# Patient Record
Sex: FEMALE | Race: WHITE | NOT HISPANIC OR LATINO | Employment: FULL TIME | URBAN - METROPOLITAN AREA
[De-identification: names, ages, dates, MRNs, and addresses within clinical notes are randomized per-mention and may not be internally consistent; named-entity substitution may affect disease eponyms.]

---

## 2023-09-20 VITALS — HEIGHT: 69 IN | WEIGHT: 182.54 LBS | BODY MASS INDEX: 27.04 KG/M2

## 2023-09-20 DIAGNOSIS — C77.3 CARCINOMA OF RIGHT BREAST METASTATIC TO AXILLARY LYMPH NODE (MULTI): Primary | ICD-10-CM

## 2023-09-20 DIAGNOSIS — C50.911 CARCINOMA OF RIGHT BREAST METASTATIC TO AXILLARY LYMPH NODE (MULTI): Primary | ICD-10-CM

## 2023-09-20 PROBLEM — Z90.13 STATUS POST BILATERAL MASTECTOMY: Status: ACTIVE | Noted: 2023-09-20

## 2023-09-20 PROBLEM — C50.919: Status: ACTIVE | Noted: 2023-09-20

## 2023-09-20 PROBLEM — G62.9 NEUROPATHY: Status: ACTIVE | Noted: 2023-09-20

## 2023-09-20 LAB
ALANINE AMINOTRANSFERASE (SGPT) (U/L) IN SER/PLAS: 14 U/L (ref 7–45)
ALBUMIN (G/DL) IN SER/PLAS: 4.7 G/DL (ref 3.4–5)
ALKALINE PHOSPHATASE (U/L) IN SER/PLAS: 59 U/L (ref 33–110)
ANION GAP IN SER/PLAS: 13 MMOL/L (ref 10–20)
ASPARTATE AMINOTRANSFERASE (SGOT) (U/L) IN SER/PLAS: 17 U/L (ref 9–39)
BASOPHILS (10*3/UL) IN BLOOD BY AUTOMATED COUNT: 0.03 X10E9/L (ref 0–0.1)
BASOPHILS/100 LEUKOCYTES IN BLOOD BY AUTOMATED COUNT: 0.6 % (ref 0–2)
BILIRUBIN TOTAL (MG/DL) IN SER/PLAS: 0.6 MG/DL (ref 0–1.2)
CALCIUM (MG/DL) IN SER/PLAS: 9.8 MG/DL (ref 8.6–10.3)
CARBON DIOXIDE, TOTAL (MMOL/L) IN SER/PLAS: 30 MMOL/L (ref 21–32)
CHLORIDE (MMOL/L) IN SER/PLAS: 103 MMOL/L (ref 98–107)
CREATININE (MG/DL) IN SER/PLAS: 0.95 MG/DL (ref 0.5–1.05)
EOSINOPHILS (10*3/UL) IN BLOOD BY AUTOMATED COUNT: 0.12 X10E9/L (ref 0–0.7)
EOSINOPHILS/100 LEUKOCYTES IN BLOOD BY AUTOMATED COUNT: 2.2 % (ref 0–6)
ERYTHROCYTE DISTRIBUTION WIDTH (RATIO) BY AUTOMATED COUNT: 12.3 % (ref 11.5–14.5)
ERYTHROCYTE MEAN CORPUSCULAR HEMOGLOBIN CONCENTRATION (G/DL) BY AUTOMATED: 33.7 G/DL (ref 32–36)
ERYTHROCYTE MEAN CORPUSCULAR VOLUME (FL) BY AUTOMATED COUNT: 87 FL (ref 80–100)
ERYTHROCYTES (10*6/UL) IN BLOOD BY AUTOMATED COUNT: 4.38 X10E12/L (ref 4–5.2)
ESTRADIOL (PG/ML) IN SER/PLAS: <20 PG/ML
FOLLITROPIN (IU/L) IN SER/PLAS: 7.3 IU/L
GFR FEMALE: 80 ML/MIN/1.73M2
GLUCOSE (MG/DL) IN SER/PLAS: 98 MG/DL (ref 74–99)
HEMATOCRIT (%) IN BLOOD BY AUTOMATED COUNT: 38.3 % (ref 36–46)
HEMOGLOBIN (G/DL) IN BLOOD: 12.9 G/DL (ref 12–16)
IMMATURE GRANULOCYTES/100 LEUKOCYTES IN BLOOD BY AUTOMATED COUNT: 0.4 % (ref 0–0.9)
LEUKOCYTES (10*3/UL) IN BLOOD BY AUTOMATED COUNT: 5.4 X10E9/L (ref 4.4–11.3)
LUTEINIZING HORMONE (IU/ML) IN SER/PLAS: 0.1 IU/L
LYMPHOCYTES (10*3/UL) IN BLOOD BY AUTOMATED COUNT: 1.8 X10E9/L (ref 1.2–4.8)
LYMPHOCYTES/100 LEUKOCYTES IN BLOOD BY AUTOMATED COUNT: 33.5 % (ref 13–44)
MONOCYTES (10*3/UL) IN BLOOD BY AUTOMATED COUNT: 0.32 X10E9/L (ref 0.1–1)
MONOCYTES/100 LEUKOCYTES IN BLOOD BY AUTOMATED COUNT: 6 % (ref 2–10)
NEUTROPHILS (10*3/UL) IN BLOOD BY AUTOMATED COUNT: 3.08 X10E9/L (ref 1.2–7.7)
NEUTROPHILS/100 LEUKOCYTES IN BLOOD BY AUTOMATED COUNT: 57.3 % (ref 40–80)
PLATELETS (10*3/UL) IN BLOOD AUTOMATED COUNT: 201 X10E9/L (ref 150–450)
POTASSIUM (MMOL/L) IN SER/PLAS: 3.7 MMOL/L (ref 3.5–5.3)
PROTEIN TOTAL: 7.5 G/DL (ref 6.4–8.2)
SODIUM (MMOL/L) IN SER/PLAS: 142 MMOL/L (ref 136–145)
UREA NITROGEN (MG/DL) IN SER/PLAS: 12 MG/DL (ref 6–23)

## 2023-09-20 RX ORDER — EPINEPHRINE 0.3 MG/.3ML
0.3 INJECTION SUBCUTANEOUS EVERY 5 MIN PRN
Status: CANCELLED | OUTPATIENT
Start: 2023-09-20

## 2023-09-20 RX ORDER — DIPHENHYDRAMINE HYDROCHLORIDE 50 MG/ML
50 INJECTION INTRAMUSCULAR; INTRAVENOUS AS NEEDED
Status: CANCELLED | OUTPATIENT
Start: 2023-09-20

## 2023-09-20 RX ORDER — ALBUTEROL SULFATE 0.83 MG/ML
3 SOLUTION RESPIRATORY (INHALATION) AS NEEDED
Status: CANCELLED | OUTPATIENT
Start: 2023-09-20

## 2023-09-20 RX ORDER — FAMOTIDINE 10 MG/ML
20 INJECTION INTRAVENOUS ONCE AS NEEDED
Status: CANCELLED | OUTPATIENT
Start: 2023-09-20

## 2023-09-20 RX ORDER — LIDOCAINE HYDROCHLORIDE 10 MG/ML
2 INJECTION, SOLUTION EPIDURAL; INFILTRATION; INTRACAUDAL; PERINEURAL ONCE
Status: CANCELLED | OUTPATIENT
Start: 2023-09-20

## 2023-09-21 RX ORDER — FAMOTIDINE 10 MG/ML
20 INJECTION INTRAVENOUS ONCE AS NEEDED
Status: CANCELLED | OUTPATIENT
Start: 2023-10-18

## 2023-09-21 RX ORDER — EPINEPHRINE 0.3 MG/.3ML
0.3 INJECTION SUBCUTANEOUS EVERY 5 MIN PRN
Status: CANCELLED | OUTPATIENT
Start: 2023-10-18

## 2023-09-21 RX ORDER — DIPHENHYDRAMINE HYDROCHLORIDE 50 MG/ML
50 INJECTION INTRAMUSCULAR; INTRAVENOUS AS NEEDED
Status: CANCELLED | OUTPATIENT
Start: 2023-10-18

## 2023-09-21 RX ORDER — ALBUTEROL SULFATE 0.83 MG/ML
3 SOLUTION RESPIRATORY (INHALATION) AS NEEDED
Status: CANCELLED | OUTPATIENT
Start: 2023-10-18

## 2023-09-21 RX ORDER — LIDOCAINE HYDROCHLORIDE 10 MG/ML
2 INJECTION, SOLUTION EPIDURAL; INFILTRATION; INTRACAUDAL; PERINEURAL ONCE
Status: CANCELLED | OUTPATIENT
Start: 2023-10-18

## 2023-10-11 PROBLEM — B00.9 HSV-1 INFECTION: Status: ACTIVE | Noted: 2023-10-11

## 2023-10-11 PROBLEM — K21.9 GERD (GASTROESOPHAGEAL REFLUX DISEASE): Status: ACTIVE | Noted: 2023-10-11

## 2023-10-11 PROBLEM — Z15.09 MONOALLELIC MUTATION OF PALB2 GENE: Status: ACTIVE | Noted: 2023-10-11

## 2023-10-11 PROBLEM — Z15.89 MONOALLELIC MUTATION OF PALB2 GENE: Status: ACTIVE | Noted: 2023-10-11

## 2023-10-11 PROBLEM — Z97.5 IUD (INTRAUTERINE DEVICE) IN PLACE: Status: ACTIVE | Noted: 2023-10-11

## 2023-10-11 PROBLEM — N93.9 ABNORMAL UTERINE BLEEDING: Status: ACTIVE | Noted: 2023-10-11

## 2023-10-11 PROBLEM — N95.8 ARTIFICIAL MENOPAUSE STATE: Status: ACTIVE | Noted: 2023-10-11

## 2023-10-11 PROBLEM — R59.0 AXILLARY LYMPHADENOPATHY: Status: ACTIVE | Noted: 2023-10-11

## 2023-10-11 PROBLEM — Z15.01 MONOALLELIC MUTATION OF PALB2 GENE: Status: ACTIVE | Noted: 2023-10-11

## 2023-10-11 RX ORDER — NEOMYCIN/POLYMYXIN B/PRAMOXINE 3.5-10K-1
CREAM (GRAM) TOPICAL
COMMUNITY
End: 2023-10-13 | Stop reason: WASHOUT

## 2023-10-11 RX ORDER — ACYCLOVIR 400 MG/1
400 TABLET ORAL 2 TIMES DAILY
COMMUNITY
Start: 2023-07-29

## 2023-10-11 RX ORDER — CHOLECALCIFEROL (VITAMIN D3) 25 MCG
TABLET ORAL
COMMUNITY

## 2023-10-11 RX ORDER — ZOLEDRONIC ACID 4 MG
KIT INTRAVENOUS
COMMUNITY

## 2023-10-11 RX ORDER — MULTIVITAMIN
1 TABLET ORAL DAILY
COMMUNITY

## 2023-10-11 RX ORDER — ALBUTEROL SULFATE 90 UG/1
AEROSOL, METERED RESPIRATORY (INHALATION)
COMMUNITY
Start: 2021-12-04 | End: 2023-10-13 | Stop reason: WASHOUT

## 2023-10-11 RX ORDER — DIAPER,BRIEF,ADULT, DISPOSABLE
1 EACH MISCELLANEOUS DAILY
COMMUNITY
Start: 2020-06-09

## 2023-10-11 RX ORDER — GABAPENTIN 100 MG/1
100 CAPSULE ORAL 3 TIMES DAILY
COMMUNITY
End: 2023-10-13 | Stop reason: WASHOUT

## 2023-10-11 RX ORDER — ANASTROZOLE 1 MG/1
1 TABLET ORAL DAILY
COMMUNITY
End: 2024-02-09 | Stop reason: SDUPTHER

## 2023-10-13 ENCOUNTER — OFFICE VISIT (OUTPATIENT)
Dept: PRIMARY CARE | Facility: CLINIC | Age: 33
End: 2023-10-13
Payer: COMMERCIAL

## 2023-10-13 VITALS
WEIGHT: 182.8 LBS | BODY MASS INDEX: 26.17 KG/M2 | DIASTOLIC BLOOD PRESSURE: 66 MMHG | HEART RATE: 65 BPM | HEIGHT: 70 IN | SYSTOLIC BLOOD PRESSURE: 114 MMHG

## 2023-10-13 DIAGNOSIS — Z00.00 ANNUAL PHYSICAL EXAM: Primary | ICD-10-CM

## 2023-10-13 DIAGNOSIS — E78.00 ELEVATED LDL CHOLESTEROL LEVEL: ICD-10-CM

## 2023-10-13 LAB
CHOLEST SERPL-MCNC: 181 MG/DL (ref 0–199)
CHOLESTEROL/HDL RATIO: 3.4
HDLC SERPL-MCNC: 53.2 MG/DL
LDLC SERPL CALC-MCNC: 115 MG/DL
NON HDL CHOLESTEROL: 128 MG/DL (ref 0–149)
TRIGL SERPL-MCNC: 64 MG/DL (ref 0–149)
VLDL: 13 MG/DL (ref 0–40)

## 2023-10-13 PROCEDURE — 99395 PREV VISIT EST AGE 18-39: CPT | Performed by: NURSE PRACTITIONER

## 2023-10-13 PROCEDURE — 80061 LIPID PANEL: CPT

## 2023-10-13 PROCEDURE — 1036F TOBACCO NON-USER: CPT | Performed by: NURSE PRACTITIONER

## 2023-10-13 PROCEDURE — 36415 COLL VENOUS BLD VENIPUNCTURE: CPT

## 2023-10-13 RX ORDER — PYRIDOXINE HCL (VITAMIN B6) 25 MG
25 TABLET ORAL DAILY
COMMUNITY

## 2023-10-13 RX ORDER — MAGNESIUM 250 MG
TABLET ORAL
COMMUNITY

## 2023-10-13 NOTE — PROGRESS NOTES
Nathaly Powell is a 33 y.o. female who is presenting for annual physical exam.     Last  Visit: 22  Reported Health: Good    Dental, Vision, Hearing:  Regular dental visits: yes  - Brushes teeth 2 times per day  - Flosses? yes  Vision problems: yes  - Wears glasses or contacts? yes, reading glasses  - Last eye exam:  a couple months ago  Hearing loss: no    Immunization status:  Up to date: no    Lifestyle:  Healthy diet: yes  Regular exercise: yes  - Exercise frequency: 3 days per week  - Type of exercise: Sylvia, Weight training  Alcohol: yes, occasionally  Tobacco: no  Drugs: no    Reproductive Health:  Menstrual problems: menopausal state due to cancer treatment  Sexually active: yes  Contraception: none    Pregnancy History:   : 2  Parity: 2  - Full term: 1  - Premature: 1  - (s):  - Livin  - AB Induced:  - AB Spont:  - Ectopic:   - Multiple births:    Cervical Cancer Screening:  Last pap:  23  History of abnormal pap smear? no  Breast Cancer Screening: n/a - hx of breast CA  Colorectal Cancer Screening:  Last colonoscopy or Cologuard:  none  Metabolic Screening:  Lipid profile: 2022  Glucose screen: 2022    Review of Systems  Gen: denies fever, chills, weight loss, fatigue  HEENT: denies sinus pressure, sinus congestion, runny nose, red eyes, itchy eyes, vision loss, ear pain, hearing loss, throat pain, trouble swallowing  Neck: denies neck pain, neck swelling or masses  Chest/breast: denies breast pain, breast lumps  CV: denies chest pain, palpitations, fast heart rate, syncope  Resp: denies shortness of breath, cough, wheezing  GI: denies abdominal pain, nausea, diarrhea, constipation, hematochezia, melena  : denies dysuria, hematuria, vaginal discharge, frequency  Endo: denies polydipsia, polyuria, heat/cold intolerance, weight change, hair thinning  Heme: denies easy bruising, easy bleeding  Neuro: denies headache, numbness, tingling, memory loss, changes  in vision  MSK: denies joint pain, joint swelling, weakness  Psych: denies suicidal ideation, homicidal ideation, depression, anxiety, mood swings  Skin: denies rashes, abnormal lesions, itching, changes in moles     Previous History  Past Medical History:   Diagnosis Date    Encounter for supervision of other normal pregnancy, third trimester 2017    Prenatal care, subsequent pregnancy in third trimester    History of uterine scar from previous surgery 2019    History of  delivery    Personal history of malignant neoplasm of breast 2020    History of malignant neoplasm of female breast    Presence of (intrauterine) contraceptive device     Intrauterine device     Past Surgical History:   Procedure Laterality Date     SECTION, CLASSIC  2019     Section    CT ABDOMEN PELVIS ANGIOGRAM W AND/OR WO IV CONTRAST  2020    CT ABDOMEN PELVIS ANGIOGRAM W AND/OR WO IV CONTRAST 2020 [facility]    OTHER SURGICAL HISTORY  2020    Mastectomy bilateral    OTHER SURGICAL HISTORY  2019    Tonsillectomy with adenoidectomy     Social History     Tobacco Use    Smoking status: Never    Smokeless tobacco: Never   Substance Use Topics    Alcohol use: Yes     Comment: occasional    Drug use: Never     Family History   Problem Relation Name Age of Onset    Breast cancer Mother      Prostate cancer Maternal Grandfather       No Known Allergies  Current Outpatient Medications   Medication Instructions    acyclovir (ZOVIRAX) 400 mg, oral, 2 times daily    albuterol 90 mcg/actuation inhaler inhalation    anastrozole (Arimidex) 1 mg tablet 1 tablet, oral, Daily    calcium carb/vitamin D3/vit K1 (CALCIUM-VITAMIN D3-VITAMIN K ORAL) oral    cholecalciferol (Vitamin D-3) 25 MCG (1000 UT) tablet oral    gabapentin (NEURONTIN) 100 mg, oral, 3 times daily    lysine 500 mg tablet 1 tablet, oral, Daily    multivitamin tablet 1 tablet, oral, Daily    mv-min-folic acid-lutein  200-137.5 mcg tablet,chewable oral, Daily RT    zoledronic acid (Zometa) 4 mg injection intravenous       Physical Exam  General: Alert and oriented, in no acute distress. Appears stated age, well-nourished, and well hydrated  HEENT:  - Head: Normocephalic and atraumatic   - Eyes: EOMI, PERRLA  - ENT: Hearing grossly intact. Mucus membranes pink and moist without lesions. Tonsils present without swelling or exudates. Good dentition. TMs gray  Neck: Supple. No stiffness. No thyromegaly or thyroid nodules  Heart: RRR. No murmurs, clicks, or rubs  Lungs: Unlabored breathing. CTAB with no crackles, wheezes, or rhonchi  Abdomen: Normal BS in all 4 quadrants. Soft, non-tender, non-distended, with no masses  Extremities: Warm and well perfused. No edema. Normal peripheral pulses  Musculoskeletal: ROM intact. Strength 5/5 in BUE and BLE. No joint swelling. Normal gait and station  Neurological: Alert and oriented. No gross neurological deficits. Normal sensation. No weakness. DTRs +2/4   Psychological: Appropriate mood and affect  Skin: No rash, abnormal lesions, cyanosis, or erythema      Assessment and Plan     Annual Physical  - Declined flu shot, plans to get next week at work  - Pap up to date, due February 2028  - Check lipid panel  - Maintain healthy lifestyle    RTC 1 year for physical or sooner ALTA Poole-CNP  [facility]

## 2023-10-18 ENCOUNTER — INFUSION (OUTPATIENT)
Dept: HEMATOLOGY/ONCOLOGY | Facility: CLINIC | Age: 33
End: 2023-10-18
Payer: COMMERCIAL

## 2023-10-18 ENCOUNTER — APPOINTMENT (OUTPATIENT)
Dept: HEMATOLOGY/ONCOLOGY | Facility: CLINIC | Age: 33
End: 2023-10-18
Payer: COMMERCIAL

## 2023-10-18 VITALS
TEMPERATURE: 98.1 F | SYSTOLIC BLOOD PRESSURE: 110 MMHG | WEIGHT: 186.07 LBS | BODY MASS INDEX: 26.7 KG/M2 | HEART RATE: 72 BPM | OXYGEN SATURATION: 100 % | DIASTOLIC BLOOD PRESSURE: 73 MMHG | RESPIRATION RATE: 16 BRPM

## 2023-10-18 DIAGNOSIS — C77.3 CARCINOMA OF RIGHT BREAST METASTATIC TO AXILLARY LYMPH NODE (MULTI): ICD-10-CM

## 2023-10-18 DIAGNOSIS — C50.911 CARCINOMA OF RIGHT BREAST METASTATIC TO AXILLARY LYMPH NODE (MULTI): ICD-10-CM

## 2023-10-18 PROCEDURE — 2500000005 HC RX 250 GENERAL PHARMACY W/O HCPCS: Performed by: NURSE PRACTITIONER

## 2023-10-18 PROCEDURE — 2500000004 HC RX 250 GENERAL PHARMACY W/ HCPCS (ALT 636 FOR OP/ED): Mod: JZ | Performed by: NURSE PRACTITIONER

## 2023-10-18 PROCEDURE — 96402 CHEMO HORMON ANTINEOPL SQ/IM: CPT

## 2023-10-18 RX ORDER — DIPHENHYDRAMINE HYDROCHLORIDE 50 MG/ML
50 INJECTION INTRAMUSCULAR; INTRAVENOUS AS NEEDED
Status: CANCELLED | OUTPATIENT
Start: 2023-11-15

## 2023-10-18 RX ORDER — LIDOCAINE HYDROCHLORIDE 10 MG/ML
2 INJECTION, SOLUTION EPIDURAL; INFILTRATION; INTRACAUDAL; PERINEURAL ONCE
Status: COMPLETED | OUTPATIENT
Start: 2023-10-18 | End: 2023-10-18

## 2023-10-18 RX ORDER — LIDOCAINE HYDROCHLORIDE 10 MG/ML
2 INJECTION, SOLUTION EPIDURAL; INFILTRATION; INTRACAUDAL; PERINEURAL ONCE
Status: CANCELLED | OUTPATIENT
Start: 2023-11-15

## 2023-10-18 RX ORDER — FAMOTIDINE 10 MG/ML
20 INJECTION INTRAVENOUS ONCE AS NEEDED
Status: CANCELLED | OUTPATIENT
Start: 2023-11-15

## 2023-10-18 RX ORDER — ALBUTEROL SULFATE 0.83 MG/ML
3 SOLUTION RESPIRATORY (INHALATION) AS NEEDED
Status: CANCELLED | OUTPATIENT
Start: 2023-11-15

## 2023-10-18 RX ORDER — EPINEPHRINE 0.3 MG/.3ML
0.3 INJECTION SUBCUTANEOUS EVERY 5 MIN PRN
Status: CANCELLED | OUTPATIENT
Start: 2023-11-15

## 2023-10-18 RX ADMIN — GOSERELIN ACETATE 3.6 MG: 3.6 IMPLANT SUBCUTANEOUS at 16:33

## 2023-10-18 RX ADMIN — LIDOCAINE HYDROCHLORIDE 20 MG: 10 INJECTION, SOLUTION EPIDURAL; INFILTRATION; INTRACAUDAL; PERINEURAL at 16:33

## 2023-10-18 ASSESSMENT — PAIN SCALES - GENERAL: PAINLEVEL: 0-NO PAIN

## 2023-11-15 ENCOUNTER — APPOINTMENT (OUTPATIENT)
Dept: HEMATOLOGY/ONCOLOGY | Facility: CLINIC | Age: 33
End: 2023-11-15
Payer: COMMERCIAL

## 2023-11-15 ENCOUNTER — INFUSION (OUTPATIENT)
Dept: HEMATOLOGY/ONCOLOGY | Facility: CLINIC | Age: 33
End: 2023-11-15
Payer: COMMERCIAL

## 2023-11-15 VITALS
HEART RATE: 65 BPM | SYSTOLIC BLOOD PRESSURE: 104 MMHG | WEIGHT: 186.73 LBS | BODY MASS INDEX: 26.79 KG/M2 | DIASTOLIC BLOOD PRESSURE: 65 MMHG | RESPIRATION RATE: 16 BRPM | OXYGEN SATURATION: 97 % | TEMPERATURE: 99.5 F

## 2023-11-15 DIAGNOSIS — C77.3 CARCINOMA OF RIGHT BREAST METASTATIC TO AXILLARY LYMPH NODE (MULTI): ICD-10-CM

## 2023-11-15 DIAGNOSIS — C50.911 CARCINOMA OF RIGHT BREAST METASTATIC TO AXILLARY LYMPH NODE (MULTI): ICD-10-CM

## 2023-11-15 PROCEDURE — 96402 CHEMO HORMON ANTINEOPL SQ/IM: CPT

## 2023-11-15 PROCEDURE — 2500000004 HC RX 250 GENERAL PHARMACY W/ HCPCS (ALT 636 FOR OP/ED): Mod: JZ | Performed by: NURSE PRACTITIONER

## 2023-11-15 PROCEDURE — 2500000005 HC RX 250 GENERAL PHARMACY W/O HCPCS: Performed by: NURSE PRACTITIONER

## 2023-11-15 PROCEDURE — 96372 THER/PROPH/DIAG INJ SC/IM: CPT

## 2023-11-15 RX ORDER — FAMOTIDINE 10 MG/ML
20 INJECTION INTRAVENOUS ONCE AS NEEDED
Status: CANCELLED | OUTPATIENT
Start: 2023-12-13

## 2023-11-15 RX ORDER — LIDOCAINE HYDROCHLORIDE 10 MG/ML
2 INJECTION, SOLUTION EPIDURAL; INFILTRATION; INTRACAUDAL; PERINEURAL ONCE
Status: COMPLETED | OUTPATIENT
Start: 2023-11-15 | End: 2023-11-15

## 2023-11-15 RX ORDER — LIDOCAINE HYDROCHLORIDE 10 MG/ML
2 INJECTION, SOLUTION EPIDURAL; INFILTRATION; INTRACAUDAL; PERINEURAL ONCE
Status: CANCELLED | OUTPATIENT
Start: 2023-12-13

## 2023-11-15 RX ORDER — EPINEPHRINE 0.3 MG/.3ML
0.3 INJECTION SUBCUTANEOUS EVERY 5 MIN PRN
Status: CANCELLED | OUTPATIENT
Start: 2023-12-13

## 2023-11-15 RX ORDER — DIPHENHYDRAMINE HYDROCHLORIDE 50 MG/ML
50 INJECTION INTRAMUSCULAR; INTRAVENOUS AS NEEDED
Status: CANCELLED | OUTPATIENT
Start: 2023-12-13

## 2023-11-15 RX ORDER — ALBUTEROL SULFATE 0.83 MG/ML
3 SOLUTION RESPIRATORY (INHALATION) AS NEEDED
Status: CANCELLED | OUTPATIENT
Start: 2023-12-13

## 2023-11-15 RX ADMIN — GOSERELIN ACETATE 3.6 MG: 3.6 IMPLANT SUBCUTANEOUS at 16:28

## 2023-11-15 RX ADMIN — LIDOCAINE HYDROCHLORIDE 20 MG: 10 INJECTION, SOLUTION EPIDURAL; INFILTRATION; INTRACAUDAL; PERINEURAL at 16:23

## 2023-11-15 ASSESSMENT — PAIN SCALES - GENERAL: PAINLEVEL: 0-NO PAIN

## 2023-12-13 ENCOUNTER — INFUSION (OUTPATIENT)
Dept: HEMATOLOGY/ONCOLOGY | Facility: CLINIC | Age: 33
End: 2023-12-13
Payer: COMMERCIAL

## 2023-12-13 VITALS
WEIGHT: 185.41 LBS | SYSTOLIC BLOOD PRESSURE: 101 MMHG | HEART RATE: 68 BPM | TEMPERATURE: 98.1 F | BODY MASS INDEX: 26.6 KG/M2 | DIASTOLIC BLOOD PRESSURE: 65 MMHG | RESPIRATION RATE: 18 BRPM

## 2023-12-13 DIAGNOSIS — C77.3 CARCINOMA OF RIGHT BREAST METASTATIC TO AXILLARY LYMPH NODE (MULTI): ICD-10-CM

## 2023-12-13 DIAGNOSIS — C50.911 CARCINOMA OF RIGHT BREAST METASTATIC TO AXILLARY LYMPH NODE (MULTI): ICD-10-CM

## 2023-12-13 PROCEDURE — 96402 CHEMO HORMON ANTINEOPL SQ/IM: CPT

## 2023-12-13 PROCEDURE — 2500000004 HC RX 250 GENERAL PHARMACY W/ HCPCS (ALT 636 FOR OP/ED): Mod: JZ | Performed by: NURSE PRACTITIONER

## 2023-12-13 PROCEDURE — 2500000005 HC RX 250 GENERAL PHARMACY W/O HCPCS: Performed by: NURSE PRACTITIONER

## 2023-12-13 RX ORDER — ALBUTEROL SULFATE 0.83 MG/ML
3 SOLUTION RESPIRATORY (INHALATION) AS NEEDED
Status: DISCONTINUED | OUTPATIENT
Start: 2023-12-13 | End: 2023-12-13 | Stop reason: HOSPADM

## 2023-12-13 RX ORDER — EPINEPHRINE 0.3 MG/.3ML
0.3 INJECTION SUBCUTANEOUS EVERY 5 MIN PRN
Status: DISCONTINUED | OUTPATIENT
Start: 2023-12-13 | End: 2023-12-13 | Stop reason: HOSPADM

## 2023-12-13 RX ORDER — DIPHENHYDRAMINE HYDROCHLORIDE 50 MG/ML
50 INJECTION INTRAMUSCULAR; INTRAVENOUS AS NEEDED
Status: DISCONTINUED | OUTPATIENT
Start: 2023-12-13 | End: 2023-12-13 | Stop reason: HOSPADM

## 2023-12-13 RX ORDER — EPINEPHRINE 0.3 MG/.3ML
0.3 INJECTION SUBCUTANEOUS EVERY 5 MIN PRN
Status: CANCELLED | OUTPATIENT
Start: 2024-01-10

## 2023-12-13 RX ORDER — DIPHENHYDRAMINE HYDROCHLORIDE 50 MG/ML
50 INJECTION INTRAMUSCULAR; INTRAVENOUS AS NEEDED
Status: CANCELLED | OUTPATIENT
Start: 2024-01-10

## 2023-12-13 RX ORDER — FAMOTIDINE 10 MG/ML
20 INJECTION INTRAVENOUS ONCE AS NEEDED
Status: CANCELLED | OUTPATIENT
Start: 2024-01-10

## 2023-12-13 RX ORDER — FAMOTIDINE 10 MG/ML
20 INJECTION INTRAVENOUS ONCE AS NEEDED
Status: DISCONTINUED | OUTPATIENT
Start: 2023-12-13 | End: 2023-12-13 | Stop reason: HOSPADM

## 2023-12-13 RX ORDER — ALBUTEROL SULFATE 0.83 MG/ML
3 SOLUTION RESPIRATORY (INHALATION) AS NEEDED
Status: CANCELLED | OUTPATIENT
Start: 2024-01-10

## 2023-12-13 RX ORDER — LIDOCAINE HYDROCHLORIDE 10 MG/ML
2 INJECTION, SOLUTION EPIDURAL; INFILTRATION; INTRACAUDAL; PERINEURAL ONCE
Status: COMPLETED | OUTPATIENT
Start: 2023-12-13 | End: 2023-12-13

## 2023-12-13 RX ORDER — LIDOCAINE HYDROCHLORIDE 10 MG/ML
2 INJECTION, SOLUTION EPIDURAL; INFILTRATION; INTRACAUDAL; PERINEURAL ONCE
Status: CANCELLED | OUTPATIENT
Start: 2024-01-10

## 2023-12-13 RX ADMIN — LIDOCAINE HYDROCHLORIDE 20 MG: 10 INJECTION, SOLUTION EPIDURAL; INFILTRATION; INTRACAUDAL; PERINEURAL at 16:26

## 2023-12-13 RX ADMIN — GOSERELIN ACETATE 3.6 MG: 3.6 IMPLANT SUBCUTANEOUS at 16:26

## 2023-12-13 ASSESSMENT — PAIN SCALES - GENERAL: PAINLEVEL: 0-NO PAIN

## 2023-12-15 ENCOUNTER — HOSPITAL ENCOUNTER (OUTPATIENT)
Dept: RADIATION ONCOLOGY | Facility: CLINIC | Age: 33
Setting detail: RADIATION/ONCOLOGY SERIES
Discharge: HOME | End: 2023-12-15
Payer: COMMERCIAL

## 2023-12-15 VITALS
RESPIRATION RATE: 18 BRPM | BODY MASS INDEX: 26.57 KG/M2 | WEIGHT: 185.19 LBS | SYSTOLIC BLOOD PRESSURE: 103 MMHG | TEMPERATURE: 97.9 F | DIASTOLIC BLOOD PRESSURE: 64 MMHG | OXYGEN SATURATION: 99 % | HEART RATE: 62 BPM

## 2023-12-15 DIAGNOSIS — C77.3 CARCINOMA OF RIGHT BREAST METASTATIC TO AXILLARY LYMPH NODE (MULTI): Primary | ICD-10-CM

## 2023-12-15 DIAGNOSIS — C50.919: ICD-10-CM

## 2023-12-15 DIAGNOSIS — C50.911 CARCINOMA OF RIGHT BREAST METASTATIC TO AXILLARY LYMPH NODE (MULTI): Primary | ICD-10-CM

## 2023-12-15 PROCEDURE — 99213 OFFICE O/P EST LOW 20 MIN: CPT | Performed by: NURSE PRACTITIONER

## 2023-12-15 ASSESSMENT — PAIN SCALES - GENERAL: PAINLEVEL: 0-NO PAIN

## 2023-12-15 NOTE — PROGRESS NOTES
Radiation Oncology Follow-Up    Patient Name:  Nathaly Powell  MRN:  [id number]  :  1990    Referring Provider: No ref. provider found  Primary Care Provider: OZ Santos  Care Team: Patient Care Team:  OZ Santos as PCP - General (Family Medicine)  [facility]    Date of Service: 12/15/2023     Cancer Staging:          Breast         AJCC Edition: 8th (AJCC), Diagnosis Date: 2019, IIIA, cT2 cN1 cM0 G3     Treatment Synopsis:    33-year-old female with a clinical T2 N1 M0, stage IIB invasive ductal carcinoma of the right breast status post neoadjuvant dose dense AC ×4 followed  by weekly Taxol ×12. Following chemotherapy she underwent a right mastectomy with sentinel lymph node biopsy and risk reducing contralateral mastectomy. Bilateral mastectomy and right SLN 19. In the right breast was residual microinvasive carcinoma.  LVI remained. 0/3 SLN involved. Left breast benign. Pathology DCIS and invasive ductal carcinoma grade 2-3 ER 95% MI 0% HER2 2+ not amplified. Following surgery, she received radiation to the right chest wall and comprehensive jewel chain consisting of  a dose of 50 Gy with incision boost to 60 Gy completed on 20.  Goserelin initiated 19. Anastrozole initiated 19 with Zometa.  Genetic testing result showed PAL B2 Dutch mutation     SUBJECTIVE  History of Present Illness:   Nathaly Powell is here today for routine follow up post radiation.  She is doing well overall. She is taking anastrozole and tolerating it without adverse effects except some  trigger finger in her thumbs at times.  Continues on goserelin monthly. She says the plan is to continue this for another year and then plan is to change to tamoxifen. Denies lymphedema or difficulty with ROM of UEs. 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 Denies headaches, fever, chills, cough, SOB, chest pain, N/V, or bony pain.  DEXA scan normal.     Review of Systems:    Review of Systems   All other systems reviewed and are negative.    Performance Status:   The Karnofsky performance scale today is 100, Fully active, able to carry on all pre-disease performed without restriction (ECOG equivalent 0).      OBJECTIVE  Vital Signs:  There were no vitals taken for this visit.     Current Outpatient Medications:   •  acyclovir (Zovirax) 400 mg tablet, Take 1 tablet (400 mg) by mouth 2 times a day., Disp: , Rfl:   •  anastrozole (Arimidex) 1 mg tablet, Take 1 tablet (1 mg total) by mouth once daily., Disp: , Rfl:   •  cholecalciferol (Vitamin D-3) 25 MCG (1000 UT) tablet, Take by mouth., Disp: , Rfl:   •  lysine 500 mg tablet, Take 1 tablet (500 mg) by mouth once daily., Disp: , Rfl:   •  magnesium 250 mg tablet, Take by mouth., Disp: , Rfl:   •  multivitamin tablet, Take 1 tablet by mouth once daily., Disp: , Rfl:   •  pyridoxine (Vitamin B-6) 25 mg tablet, Take 1 tablet (25 mg) by mouth once daily., Disp: , Rfl:   •  zoledronic acid (Zometa) 4 mg injection, Infuse into a venous catheter., Disp: , Rfl:      Physical Exam  Constitutional:       Appearance: Normal appearance.   HENT:      Head: Normocephalic and atraumatic.      Nose: Nose normal.      Mouth/Throat:      Mouth: Mucous membranes are moist.      Pharynx: Oropharynx is clear.   Eyes:      Conjunctiva/sclera: Conjunctivae normal.      Pupils: Pupils are equal, round, and reactive to light.   Cardiovascular:      Rate and Rhythm: Normal rate and regular rhythm.      Heart sounds: Normal heart sounds.   Pulmonary:      Effort: Pulmonary effort is normal.      Breath sounds: Normal breath sounds.   Chest:   Breasts:     Right: Absent.      Left: Absent.      Comments: Bilateral TRAM reconstruction - no suspicious palpable nodules or lesions.   Abdominal:      Palpations: Abdomen is soft.   Musculoskeletal:         General: No swelling. Normal range of motion.      Cervical back: Normal  range of motion and neck supple.   Lymphadenopathy:      Cervical: No cervical adenopathy.      Upper Body:      Right upper body: No supraclavicular or axillary adenopathy.      Left upper body: No supraclavicular or axillary adenopathy.   Skin:     General: Skin is warm and dry.   Neurological:      General: No focal deficit present.      Mental Status: She is alert and oriented to person, place, and time.   Psychiatric:         Mood and Affect: Mood normal.         Behavior: Behavior normal.       ASSESSMENT/PLAN:  33 y.o. female with right sided breast cancer s/p neoadjuvant chemotherapy followed by mastectomy and left prophylactic mastectomy followed by bilateral TRAM reconstruction.   Doing well post treatment and she is pleased with breast reconstruction. Continues ovarian suppression and AI.  Radiation follow up in 12 mo for office visit.  Call us with any concerns.        Radha Dickens CNP  [phone]

## 2024-01-10 ENCOUNTER — INFUSION (OUTPATIENT)
Dept: HEMATOLOGY/ONCOLOGY | Facility: CLINIC | Age: 34
End: 2024-01-10
Payer: COMMERCIAL

## 2024-01-10 VITALS
TEMPERATURE: 97.7 F | HEART RATE: 65 BPM | SYSTOLIC BLOOD PRESSURE: 104 MMHG | OXYGEN SATURATION: 99 % | BODY MASS INDEX: 26.92 KG/M2 | WEIGHT: 187.6 LBS | DIASTOLIC BLOOD PRESSURE: 64 MMHG

## 2024-01-10 DIAGNOSIS — C77.3 CARCINOMA OF RIGHT BREAST METASTATIC TO AXILLARY LYMPH NODE (MULTI): ICD-10-CM

## 2024-01-10 DIAGNOSIS — C50.911 CARCINOMA OF RIGHT BREAST METASTATIC TO AXILLARY LYMPH NODE (MULTI): ICD-10-CM

## 2024-01-10 PROCEDURE — 2500000004 HC RX 250 GENERAL PHARMACY W/ HCPCS (ALT 636 FOR OP/ED): Mod: JZ | Performed by: NURSE PRACTITIONER

## 2024-01-10 PROCEDURE — 96402 CHEMO HORMON ANTINEOPL SQ/IM: CPT

## 2024-01-10 PROCEDURE — 2500000005 HC RX 250 GENERAL PHARMACY W/O HCPCS: Performed by: NURSE PRACTITIONER

## 2024-01-10 RX ORDER — EPINEPHRINE 0.3 MG/.3ML
0.3 INJECTION SUBCUTANEOUS EVERY 5 MIN PRN
Status: CANCELLED | OUTPATIENT
Start: 2024-02-07

## 2024-01-10 RX ORDER — LIDOCAINE HYDROCHLORIDE 10 MG/ML
2 INJECTION, SOLUTION EPIDURAL; INFILTRATION; INTRACAUDAL; PERINEURAL ONCE
Status: CANCELLED | OUTPATIENT
Start: 2024-02-07

## 2024-01-10 RX ORDER — FAMOTIDINE 10 MG/ML
20 INJECTION INTRAVENOUS ONCE AS NEEDED
Status: DISCONTINUED | OUTPATIENT
Start: 2024-01-10 | End: 2024-01-10 | Stop reason: HOSPADM

## 2024-01-10 RX ORDER — DIPHENHYDRAMINE HYDROCHLORIDE 50 MG/ML
50 INJECTION INTRAMUSCULAR; INTRAVENOUS AS NEEDED
Status: CANCELLED | OUTPATIENT
Start: 2024-02-07

## 2024-01-10 RX ORDER — LIDOCAINE HYDROCHLORIDE 10 MG/ML
2 INJECTION, SOLUTION EPIDURAL; INFILTRATION; INTRACAUDAL; PERINEURAL ONCE
Status: COMPLETED | OUTPATIENT
Start: 2024-01-10 | End: 2024-01-10

## 2024-01-10 RX ORDER — ALBUTEROL SULFATE 0.83 MG/ML
3 SOLUTION RESPIRATORY (INHALATION) AS NEEDED
Status: DISCONTINUED | OUTPATIENT
Start: 2024-01-10 | End: 2024-01-10 | Stop reason: HOSPADM

## 2024-01-10 RX ORDER — DIPHENHYDRAMINE HYDROCHLORIDE 50 MG/ML
50 INJECTION INTRAMUSCULAR; INTRAVENOUS AS NEEDED
Status: DISCONTINUED | OUTPATIENT
Start: 2024-01-10 | End: 2024-01-10 | Stop reason: HOSPADM

## 2024-01-10 RX ORDER — EPINEPHRINE 0.3 MG/.3ML
0.3 INJECTION SUBCUTANEOUS EVERY 5 MIN PRN
Status: DISCONTINUED | OUTPATIENT
Start: 2024-01-10 | End: 2024-01-10 | Stop reason: HOSPADM

## 2024-01-10 RX ORDER — ALBUTEROL SULFATE 0.83 MG/ML
3 SOLUTION RESPIRATORY (INHALATION) AS NEEDED
Status: CANCELLED | OUTPATIENT
Start: 2024-02-07

## 2024-01-10 RX ORDER — FAMOTIDINE 10 MG/ML
20 INJECTION INTRAVENOUS ONCE AS NEEDED
Status: CANCELLED | OUTPATIENT
Start: 2024-02-07

## 2024-01-10 RX ADMIN — LIDOCAINE HYDROCHLORIDE 20 MG: 10 INJECTION, SOLUTION EPIDURAL; INFILTRATION; INTRACAUDAL; PERINEURAL at 16:37

## 2024-01-10 RX ADMIN — GOSERELIN ACETATE 3.6 MG: 3.6 IMPLANT SUBCUTANEOUS at 16:37

## 2024-01-10 ASSESSMENT — PAIN SCALES - GENERAL
PAINLEVEL_OUTOF10: 0 - NO PAIN
PAINLEVEL: 0-NO PAIN

## 2024-02-07 ENCOUNTER — INFUSION (OUTPATIENT)
Dept: HEMATOLOGY/ONCOLOGY | Facility: CLINIC | Age: 34
End: 2024-02-07
Payer: COMMERCIAL

## 2024-02-07 VITALS
RESPIRATION RATE: 18 BRPM | HEART RATE: 61 BPM | BODY MASS INDEX: 26.96 KG/M2 | DIASTOLIC BLOOD PRESSURE: 74 MMHG | SYSTOLIC BLOOD PRESSURE: 112 MMHG | TEMPERATURE: 95.9 F | WEIGHT: 187.9 LBS | OXYGEN SATURATION: 98 %

## 2024-02-07 DIAGNOSIS — C50.911 CARCINOMA OF RIGHT BREAST METASTATIC TO AXILLARY LYMPH NODE (MULTI): ICD-10-CM

## 2024-02-07 DIAGNOSIS — C77.3 CARCINOMA OF RIGHT BREAST METASTATIC TO AXILLARY LYMPH NODE (MULTI): ICD-10-CM

## 2024-02-07 PROCEDURE — 2500000004 HC RX 250 GENERAL PHARMACY W/ HCPCS (ALT 636 FOR OP/ED): Mod: JZ | Performed by: NURSE PRACTITIONER

## 2024-02-07 PROCEDURE — 96372 THER/PROPH/DIAG INJ SC/IM: CPT

## 2024-02-07 PROCEDURE — 96402 CHEMO HORMON ANTINEOPL SQ/IM: CPT

## 2024-02-07 PROCEDURE — 2500000005 HC RX 250 GENERAL PHARMACY W/O HCPCS: Performed by: NURSE PRACTITIONER

## 2024-02-07 RX ORDER — ALBUTEROL SULFATE 0.83 MG/ML
3 SOLUTION RESPIRATORY (INHALATION) AS NEEDED
Status: CANCELLED | OUTPATIENT
Start: 2024-03-06

## 2024-02-07 RX ORDER — DIPHENHYDRAMINE HYDROCHLORIDE 50 MG/ML
50 INJECTION INTRAMUSCULAR; INTRAVENOUS AS NEEDED
Status: DISCONTINUED | OUTPATIENT
Start: 2024-02-07 | End: 2024-02-07 | Stop reason: HOSPADM

## 2024-02-07 RX ORDER — EPINEPHRINE 0.3 MG/.3ML
0.3 INJECTION SUBCUTANEOUS EVERY 5 MIN PRN
Status: DISCONTINUED | OUTPATIENT
Start: 2024-02-07 | End: 2024-02-07 | Stop reason: HOSPADM

## 2024-02-07 RX ORDER — FAMOTIDINE 10 MG/ML
20 INJECTION INTRAVENOUS ONCE AS NEEDED
Status: CANCELLED | OUTPATIENT
Start: 2024-03-06

## 2024-02-07 RX ORDER — LIDOCAINE HYDROCHLORIDE 10 MG/ML
2 INJECTION, SOLUTION EPIDURAL; INFILTRATION; INTRACAUDAL; PERINEURAL ONCE
Status: COMPLETED | OUTPATIENT
Start: 2024-02-07 | End: 2024-02-07

## 2024-02-07 RX ORDER — DIPHENHYDRAMINE HYDROCHLORIDE 50 MG/ML
50 INJECTION INTRAMUSCULAR; INTRAVENOUS AS NEEDED
Status: CANCELLED | OUTPATIENT
Start: 2024-03-06

## 2024-02-07 RX ORDER — ALBUTEROL SULFATE 0.83 MG/ML
3 SOLUTION RESPIRATORY (INHALATION) AS NEEDED
Status: DISCONTINUED | OUTPATIENT
Start: 2024-02-07 | End: 2024-02-07 | Stop reason: HOSPADM

## 2024-02-07 RX ORDER — FAMOTIDINE 10 MG/ML
20 INJECTION INTRAVENOUS ONCE AS NEEDED
Status: DISCONTINUED | OUTPATIENT
Start: 2024-02-07 | End: 2024-02-07 | Stop reason: HOSPADM

## 2024-02-07 RX ORDER — LIDOCAINE HYDROCHLORIDE 10 MG/ML
2 INJECTION, SOLUTION EPIDURAL; INFILTRATION; INTRACAUDAL; PERINEURAL ONCE
Status: CANCELLED | OUTPATIENT
Start: 2024-03-06

## 2024-02-07 RX ORDER — EPINEPHRINE 0.3 MG/.3ML
0.3 INJECTION SUBCUTANEOUS EVERY 5 MIN PRN
Status: CANCELLED | OUTPATIENT
Start: 2024-03-06

## 2024-02-07 RX ADMIN — GOSERELIN ACETATE 3.6 MG: 3.6 IMPLANT SUBCUTANEOUS at 16:12

## 2024-02-07 RX ADMIN — LIDOCAINE HYDROCHLORIDE 20 MG: 10 INJECTION, SOLUTION EPIDURAL; INFILTRATION; INTRACAUDAL; PERINEURAL at 16:00

## 2024-02-07 ASSESSMENT — PAIN SCALES - GENERAL: PAINLEVEL: 0-NO PAIN

## 2024-02-09 ENCOUNTER — OFFICE VISIT (OUTPATIENT)
Dept: OBSTETRICS AND GYNECOLOGY | Facility: HOSPITAL | Age: 34
End: 2024-02-09
Payer: COMMERCIAL

## 2024-02-09 ENCOUNTER — TELEPHONE (OUTPATIENT)
Dept: ADMISSION | Facility: HOSPITAL | Age: 34
End: 2024-02-09

## 2024-02-09 VITALS
HEIGHT: 70 IN | BODY MASS INDEX: 26.63 KG/M2 | SYSTOLIC BLOOD PRESSURE: 108 MMHG | DIASTOLIC BLOOD PRESSURE: 67 MMHG | WEIGHT: 186 LBS

## 2024-02-09 DIAGNOSIS — Z91.89 HIGH RISK OF OVARIAN CANCER: Primary | ICD-10-CM

## 2024-02-09 DIAGNOSIS — C77.3 CARCINOMA OF RIGHT BREAST METASTATIC TO AXILLARY LYMPH NODE (MULTI): Primary | ICD-10-CM

## 2024-02-09 DIAGNOSIS — C50.911 CARCINOMA OF RIGHT BREAST METASTATIC TO AXILLARY LYMPH NODE (MULTI): Primary | ICD-10-CM

## 2024-02-09 PROCEDURE — 1036F TOBACCO NON-USER: CPT | Performed by: OBSTETRICS & GYNECOLOGY

## 2024-02-09 PROCEDURE — 99395 PREV VISIT EST AGE 18-39: CPT | Performed by: OBSTETRICS & GYNECOLOGY

## 2024-02-09 RX ORDER — ANASTROZOLE 1 MG/1
1 TABLET ORAL DAILY
Qty: 90 TABLET | Refills: 0 | Status: SHIPPED | OUTPATIENT
Start: 2024-02-09 | End: 2024-02-28 | Stop reason: SDUPTHER

## 2024-02-09 SDOH — ECONOMIC STABILITY: FOOD INSECURITY: WITHIN THE PAST 12 MONTHS, THE FOOD YOU BOUGHT JUST DIDN'T LAST AND YOU DIDN'T HAVE MONEY TO GET MORE.: NEVER TRUE

## 2024-02-09 SDOH — ECONOMIC STABILITY: FOOD INSECURITY: WITHIN THE PAST 12 MONTHS, YOU WORRIED THAT YOUR FOOD WOULD RUN OUT BEFORE YOU GOT MONEY TO BUY MORE.: NEVER TRUE

## 2024-02-09 ASSESSMENT — SOCIAL DETERMINANTS OF HEALTH (SDOH)
WITHIN THE LAST YEAR, HAVE YOU BEEN KICKED, HIT, SLAPPED, OR OTHERWISE PHYSICALLY HURT BY YOUR PARTNER OR EX-PARTNER?: NO
WITHIN THE LAST YEAR, HAVE YOU BEEN AFRAID OF YOUR PARTNER OR EX-PARTNER?: NO
WITHIN THE LAST YEAR, HAVE YOU BEEN HUMILIATED OR EMOTIONALLY ABUSED IN OTHER WAYS BY YOUR PARTNER OR EX-PARTNER?: NO
WITHIN THE LAST YEAR, HAVE TO BEEN RAPED OR FORCED TO HAVE ANY KIND OF SEXUAL ACTIVITY BY YOUR PARTNER OR EX-PARTNER?: NO

## 2024-02-09 ASSESSMENT — ENCOUNTER SYMPTOMS
FEVER: 0
DIZZINESS: 0
HEMATURIA: 0
SHORTNESS OF BREATH: 0
ABDOMINAL PAIN: 0
DYSURIA: 0
COUGH: 0
HEADACHES: 0
CHILLS: 0
FREQUENCY: 0
WEAKNESS: 0
DIARRHEA: 0
CONSTIPATION: 0
VOMITING: 0
PALPITATIONS: 0
NAUSEA: 0

## 2024-02-09 ASSESSMENT — PATIENT HEALTH QUESTIONNAIRE - PHQ9
SUM OF ALL RESPONSES TO PHQ9 QUESTIONS 1 & 2: 0
2. FEELING DOWN, DEPRESSED OR HOPELESS: NOT AT ALL
1. LITTLE INTEREST OR PLEASURE IN DOING THINGS: NOT AT ALL

## 2024-02-09 ASSESSMENT — PAIN SCALES - GENERAL: PAINLEVEL: 0-NO PAIN

## 2024-02-09 NOTE — PROGRESS NOTES
Well Woman Visit    SUBJECTIVE  34 y.o.  female presents for well woman exam     OB/GYN History  OB History    Para Term  AB Living   2 2 1 1   2   SAB IAB Ectopic Multiple Live Births           2      # Outcome Date GA Lbr David/2nd Weight Sex Delivery Anes PTL Lv   2 Term 17    F Vag-Spont EPI  SUE   1  16 34w5d   M CS-LTranv EPI Y SUE       Menstrual status: Menopausal  No LMP recorded. (Menstrual status: Menopausal).  Abnormal bleeding: Yes - rare spotting  Discharge: No  Pelvic pain: No  Pelvic prolapse: No  Urinary/fecal incontinence or overactive bladder: No    Social History     Substance and Sexual Activity   Sexual Activity Yes    Partners: Male     Sexually transmitted infections:no past history  History of abnormal pap: No  Last pap: approximate date 2023 and was normal  Sexual concerns: No  Desire to become pregnant in the next year: No    Other: None     The following portions of the chart were reviewed this encounter and updated as appropriate:    Tobacco  Allergies  Meds  Problems  Med Hx  Surg Hx  Fam Hx          Screenings  Social Determinants of Health     Tobacco Use: Low Risk  (2024)    Patient History     Smoking Tobacco Use: Never     Smokeless Tobacco Use: Never     Passive Exposure: Not on file   Alcohol Use: Not on file   Financial Resource Strain: Not on file   Food Insecurity: No Food Insecurity (2024)    Hunger Vital Sign     Worried About Running Out of Food in the Last Year: Never true     Ran Out of Food in the Last Year: Never true   Transportation Needs: Not on file   Physical Activity: Not on file   Stress: No Stress Concern Present (2024)    [employer] - Occupational Stress Questionnaire     Feeling of Stress : Not at all   Social Connections: Not on file   Intimate Partner Violence: Not At Risk (2024)    Humiliation, Afraid, Rape, and Kick questionnaire     Fear of Current or Ex-Partner:  "No     Emotionally Abused: No     Physically Abused: No     Sexually Abused: No   Depression: Not at risk (2/9/2024)    PHQ-2     PHQ-2 Score: 0   Housing Stability: Not on file   Utilities: Not on file   Digital Equity: Not on file         Hereditary Cancer Risk Assessment:   Family History   Problem Relation Name Age of Onset    Breast cancer Mother      Prostate cancer Maternal Grandfather          Review of Systems  Review of Systems   Constitutional:  Negative for chills and fever.   Eyes:  Negative for visual disturbance.   Respiratory:  Negative for cough and shortness of breath.    Cardiovascular:  Negative for chest pain and palpitations.   Gastrointestinal:  Negative for abdominal pain, constipation, diarrhea, nausea and vomiting.   Endocrine: Positive for heat intolerance.   Genitourinary:  Negative for dyspareunia, dysuria, frequency, hematuria, urgency, vaginal bleeding and vaginal discharge.   Neurological:  Negative for dizziness, weakness and headaches.        OBJECTIVE  Vitals:    02/09/24 1437   BP: 108/67   Weight: 84.4 kg (186 lb)   Height: 1.778 m (5' 10\")     Body mass index is 26.69 kg/m².      Physical Exam  Constitutional:       General: She is not in acute distress.     Appearance: Normal appearance.   Genitourinary:      Vulva and rectum normal.      Right Labia: No skin changes.     Left Labia: No skin changes.     No vaginal discharge.      Moderate vaginal atrophy present.       Right Adnexa: not tender and no mass present.     Left Adnexa: not tender and no mass present.     No cervical lesion.      Uterus is not tender or irregular.   HENT:      Head: Normocephalic and atraumatic.      Nose: Nose normal.      Mouth/Throat:      Mouth: Mucous membranes are moist.      Pharynx: Oropharynx is clear.   Eyes:      Extraocular Movements: Extraocular movements intact.      Conjunctiva/sclera: Conjunctivae normal.      Pupils: Pupils are equal, round, and reactive to light.   Cardiovascular: "      Rate and Rhythm: Normal rate.      Pulses: Normal pulses.   Pulmonary:      Effort: Pulmonary effort is normal.   Abdominal:      General: Abdomen is flat. There is no distension.      Palpations: Abdomen is soft.      Tenderness: There is no abdominal tenderness. There is no guarding or rebound.   Musculoskeletal:         General: Normal range of motion.   Neurological:      General: No focal deficit present.      Mental Status: She is alert and oriented to person, place, and time.   Skin:     General: Skin is warm and dry.   Psychiatric:         Mood and Affect: Mood normal.         Behavior: Behavior normal.   Vitals reviewed. Exam conducted with a chaperone present.          Immunization History   Administered Date(s) Administered    DTaP, Unspecified 1990, 1990, 1990, 06/09/1992, 02/26/1999    Flu vaccine (IIV4), preservative free *Check age/dose* 09/13/2019, 12/22/2020, 10/11/2021, 10/17/2022    Hepatitis B vaccine, pediatric/adolescent (RECOMBIVAX, ENGERIX) 04/11/2000, 05/12/2000, 10/11/2000    HiB, unspecified 06/09/1992    Influenza, injectable, quadrivalent 09/13/2016, 09/25/2017    Influenza, seasonal, injectable 10/19/2015    Influenza, seasonal, injectable, preservative free 09/13/2016    MMR vaccine, subcutaneous (MMR II) 05/04/1989, 04/11/2000    Pfizer COVID-19 vaccine, Fall 2023, 12 years and older, (30mcg/0.3mL) 10/16/2023    Pfizer COVID-19 vaccine, bivalent, age 12 years and older (30 mcg/0.3 mL) 10/17/2022    Pfizer Purple Cap SARS-CoV-2 02/17/2021, 03/09/2021, 10/11/2021    Polio, Unspecified 1990, 1988, 06/09/1992    Tdap vaccine, age 7 year and older (BOOSTRIX, ADACEL) 04/05/2016, 09/25/2017         ASSESSMENT & PLAN  -Well woman screenings performed today  -Reviewed cervical cancer screening recommendations  -Moderate atrophy on exam - controlled with vaginal lubricant  -Going to try Lume Deoderant for sweating  -Encouraged routine wellness exams with PCP  Lilliam Anderson, APRN-CNP     -Issues identified and addressed today:   Problem List Items Addressed This Visit    None  Visit Diagnoses       High risk of ovarian cancer    -  Primary    Relevant Orders    US PELVIS TRANSABDOMINAL WITH TRANSVAGINAL          Follow up 1 year, sooner if needed    Mary Kay Donnelly MD  Obstetrics & Gynecology  02/09/24

## 2024-02-09 NOTE — TELEPHONE ENCOUNTER
Nathaly called to update her pharmacy and also request Anastrozole refill. She has a FUV on 2/28 but will run out of medication by then. Pended to [facility]. No further questions or concerns at this time.

## 2024-02-19 ENCOUNTER — APPOINTMENT (OUTPATIENT)
Dept: OBSTETRICS AND GYNECOLOGY | Facility: CLINIC | Age: 34
End: 2024-02-19
Payer: COMMERCIAL

## 2024-02-21 ENCOUNTER — APPOINTMENT (OUTPATIENT)
Dept: RADIOLOGY | Facility: HOSPITAL | Age: 34
End: 2024-02-21
Payer: COMMERCIAL

## 2024-02-22 ENCOUNTER — HOSPITAL ENCOUNTER (OUTPATIENT)
Dept: RADIOLOGY | Facility: HOSPITAL | Age: 34
Discharge: HOME | End: 2024-02-22
Payer: COMMERCIAL

## 2024-02-22 DIAGNOSIS — Z91.89 HIGH RISK OF OVARIAN CANCER: ICD-10-CM

## 2024-02-22 PROCEDURE — 76830 TRANSVAGINAL US NON-OB: CPT | Performed by: RADIOLOGY

## 2024-02-22 PROCEDURE — 76856 US EXAM PELVIC COMPLETE: CPT | Performed by: RADIOLOGY

## 2024-02-22 PROCEDURE — 76856 US EXAM PELVIC COMPLETE: CPT

## 2024-02-27 PROBLEM — Z08 ENCOUNTER FOR FOLLOW-UP SURVEILLANCE OF BREAST CANCER: Status: ACTIVE | Noted: 2024-02-27

## 2024-02-27 PROBLEM — Z85.3 ENCOUNTER FOR FOLLOW-UP SURVEILLANCE OF BREAST CANCER: Status: ACTIVE | Noted: 2024-02-27

## 2024-02-27 PROBLEM — Z79.811 AROMATASE INHIBITOR USE: Status: ACTIVE | Noted: 2024-02-27

## 2024-02-27 PROBLEM — E28.39 SUPPRESSION OF OVARIAN SECRETION: Status: ACTIVE | Noted: 2024-02-27

## 2024-02-28 ENCOUNTER — OFFICE VISIT (OUTPATIENT)
Dept: HEMATOLOGY/ONCOLOGY | Facility: CLINIC | Age: 34
End: 2024-02-28
Payer: COMMERCIAL

## 2024-02-28 ENCOUNTER — INFUSION (OUTPATIENT)
Dept: HEMATOLOGY/ONCOLOGY | Facility: CLINIC | Age: 34
End: 2024-02-28
Payer: COMMERCIAL

## 2024-02-28 VITALS
BODY MASS INDEX: 26.75 KG/M2 | DIASTOLIC BLOOD PRESSURE: 78 MMHG | HEART RATE: 64 BPM | WEIGHT: 186.4 LBS | TEMPERATURE: 98.4 F | SYSTOLIC BLOOD PRESSURE: 114 MMHG | OXYGEN SATURATION: 98 %

## 2024-02-28 VITALS
WEIGHT: 186 LBS | OXYGEN SATURATION: 96 % | BODY MASS INDEX: 26.69 KG/M2 | HEART RATE: 63 BPM | SYSTOLIC BLOOD PRESSURE: 108 MMHG | TEMPERATURE: 94.6 F | DIASTOLIC BLOOD PRESSURE: 71 MMHG | RESPIRATION RATE: 18 BRPM

## 2024-02-28 DIAGNOSIS — Z15.89 MONOALLELIC MUTATION OF PALB2 GENE: ICD-10-CM

## 2024-02-28 DIAGNOSIS — Z79.811 AROMATASE INHIBITOR USE: ICD-10-CM

## 2024-02-28 DIAGNOSIS — C77.3 CARCINOMA OF RIGHT BREAST METASTATIC TO AXILLARY LYMPH NODE (MULTI): ICD-10-CM

## 2024-02-28 DIAGNOSIS — C77.3 CARCINOMA OF RIGHT BREAST METASTATIC TO AXILLARY LYMPH NODE (MULTI): Primary | ICD-10-CM

## 2024-02-28 DIAGNOSIS — C50.911 CARCINOMA OF RIGHT BREAST METASTATIC TO AXILLARY LYMPH NODE (MULTI): Primary | ICD-10-CM

## 2024-02-28 DIAGNOSIS — Z85.3 ENCOUNTER FOR FOLLOW-UP SURVEILLANCE OF BREAST CANCER: ICD-10-CM

## 2024-02-28 DIAGNOSIS — Z90.13 STATUS POST BILATERAL MASTECTOMY: ICD-10-CM

## 2024-02-28 DIAGNOSIS — C50.911 CARCINOMA OF RIGHT BREAST METASTATIC TO AXILLARY LYMPH NODE (MULTI): ICD-10-CM

## 2024-02-28 DIAGNOSIS — E28.39 SUPPRESSION OF OVARIAN SECRETION: ICD-10-CM

## 2024-02-28 DIAGNOSIS — Z08 ENCOUNTER FOR FOLLOW-UP SURVEILLANCE OF BREAST CANCER: ICD-10-CM

## 2024-02-28 DIAGNOSIS — Z15.09 MONOALLELIC MUTATION OF PALB2 GENE: ICD-10-CM

## 2024-02-28 DIAGNOSIS — Z15.01 MONOALLELIC MUTATION OF PALB2 GENE: ICD-10-CM

## 2024-02-28 PROCEDURE — 99215 OFFICE O/P EST HI 40 MIN: CPT | Performed by: NURSE PRACTITIONER

## 2024-02-28 PROCEDURE — 2500000005 HC RX 250 GENERAL PHARMACY W/O HCPCS: Performed by: NURSE PRACTITIONER

## 2024-02-28 PROCEDURE — 1036F TOBACCO NON-USER: CPT | Performed by: NURSE PRACTITIONER

## 2024-02-28 PROCEDURE — 96402 CHEMO HORMON ANTINEOPL SQ/IM: CPT

## 2024-02-28 PROCEDURE — 2500000004 HC RX 250 GENERAL PHARMACY W/ HCPCS (ALT 636 FOR OP/ED): Mod: JZ | Performed by: NURSE PRACTITIONER

## 2024-02-28 PROCEDURE — 96372 THER/PROPH/DIAG INJ SC/IM: CPT

## 2024-02-28 RX ORDER — EPINEPHRINE 0.3 MG/.3ML
0.3 INJECTION SUBCUTANEOUS EVERY 5 MIN PRN
Status: CANCELLED | OUTPATIENT
Start: 2024-03-06

## 2024-02-28 RX ORDER — ANASTROZOLE 1 MG/1
1 TABLET ORAL DAILY
Qty: 90 TABLET | Refills: 3 | Status: SHIPPED | OUTPATIENT
Start: 2024-02-28 | End: 2024-05-08 | Stop reason: SDUPTHER

## 2024-02-28 RX ORDER — DIPHENHYDRAMINE HYDROCHLORIDE 50 MG/ML
50 INJECTION INTRAMUSCULAR; INTRAVENOUS AS NEEDED
Status: CANCELLED | OUTPATIENT
Start: 2024-03-06

## 2024-02-28 RX ORDER — FAMOTIDINE 10 MG/ML
20 INJECTION INTRAVENOUS ONCE AS NEEDED
Status: DISCONTINUED | OUTPATIENT
Start: 2024-02-28 | End: 2024-02-28 | Stop reason: HOSPADM

## 2024-02-28 RX ORDER — LIDOCAINE HYDROCHLORIDE 10 MG/ML
2 INJECTION, SOLUTION EPIDURAL; INFILTRATION; INTRACAUDAL; PERINEURAL ONCE
Status: CANCELLED | OUTPATIENT
Start: 2024-03-06

## 2024-02-28 RX ORDER — FAMOTIDINE 10 MG/ML
20 INJECTION INTRAVENOUS ONCE AS NEEDED
Status: CANCELLED | OUTPATIENT
Start: 2024-03-06

## 2024-02-28 RX ORDER — DIPHENHYDRAMINE HYDROCHLORIDE 50 MG/ML
50 INJECTION INTRAMUSCULAR; INTRAVENOUS AS NEEDED
Status: DISCONTINUED | OUTPATIENT
Start: 2024-02-28 | End: 2024-02-28 | Stop reason: HOSPADM

## 2024-02-28 RX ORDER — ALBUTEROL SULFATE 0.83 MG/ML
3 SOLUTION RESPIRATORY (INHALATION) AS NEEDED
Status: CANCELLED | OUTPATIENT
Start: 2024-03-06

## 2024-02-28 RX ORDER — LIDOCAINE HYDROCHLORIDE 10 MG/ML
2 INJECTION, SOLUTION EPIDURAL; INFILTRATION; INTRACAUDAL; PERINEURAL ONCE
Status: COMPLETED | OUTPATIENT
Start: 2024-02-28 | End: 2024-02-28

## 2024-02-28 RX ORDER — EPINEPHRINE 0.3 MG/.3ML
0.3 INJECTION SUBCUTANEOUS EVERY 5 MIN PRN
Status: DISCONTINUED | OUTPATIENT
Start: 2024-02-28 | End: 2024-02-28 | Stop reason: HOSPADM

## 2024-02-28 RX ORDER — ALBUTEROL SULFATE 0.83 MG/ML
3 SOLUTION RESPIRATORY (INHALATION) AS NEEDED
Status: DISCONTINUED | OUTPATIENT
Start: 2024-02-28 | End: 2024-02-28 | Stop reason: HOSPADM

## 2024-02-28 RX ADMIN — GOSERELIN ACETATE 3.6 MG: 3.6 IMPLANT SUBCUTANEOUS at 15:43

## 2024-02-28 RX ADMIN — LIDOCAINE HYDROCHLORIDE 20 MG: 10 INJECTION, SOLUTION EPIDURAL; INFILTRATION; INTRACAUDAL; PERINEURAL at 15:46

## 2024-02-28 ASSESSMENT — PAIN SCALES - GENERAL: PAINLEVEL: 0-NO PAIN

## 2024-02-28 NOTE — PATIENT INSTRUCTIONS
Continue Anastrozole daily. Will remain on this for 5 years along with Zoladex (Dec 2024) then will likely transition to Tamoxifen for 5 years- this is my preference. We could stay on anastrozole for 2 months and then check hormone levels however the safest plan is to transition to tamoxifen for 5 years.      Zoladex monthly today, 3/27, 4/24, 5/22, 6/26, 7/24, 8/21, 9/18, 10/16, 11/13, 12/11 (this will be your final dose)     Emerita HOLM, CNP 6 month follow-up August/ Sept 2024     Follow up with Radha Dickens CNP December 13 2024     DEXA completed Sept 2023 with normal results, planned repeat late 2025     GYN Dr. Haskins annually for pelvic ultrasound and follow-up      PCP Dr Morrissey annually and as needed      Call with any questions, concerns or treatment intolerance prior to next office visit [phone].

## 2024-02-28 NOTE — PROGRESS NOTES
Patient ID: Nathaly Powell is a 34 y.o. female.  BREAST CANCER DIAGNOSIS:    Breast         AJCC Edition: 8th (AJCC), Diagnosis Date: 2019, IIIA, cT2 cN1 cM0 G3        Treatment History:    1 Screening mammogram 3/24/19 showed suspicious right breast calcifications over 5 cm  2. Core biopsy right breast 3/28/19 showed DCIS and invasive ductal carcinoma grade 2-3 ER 95% [place] 0% HER2 2+ not amplified. Core biopsy right axillary LN 4/15/19 positive for adenocarcinoma  3. Metastatic work up 19 negative bone scan and CT other than breast and LN findings right axilla  4. Preoperative chemotherapy with Adriamycin/Cytoxan x4 dose dense followed by weekly Paclitaxel x 12 initiated 5/10/19 and completed 19.   5. 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 She had bilateral mastectomy and right SLN 19. In the right breast was residual microinvasive carcinoma. LVI remained. 0/3 SLN involved. Left breast benign   7. Goserelin initiated 19. Anastrozole started 19 with Zometa  8. Radiation to R chest wall completed 2020.   9. On 10/14/20 - Second stage b/l breast reconstruction with BRIDGET flap muscle sparing. Due to this surgery, she received one dose of Trelstar IM instead of Zoladex in October. Then she will go back to Zoladex but transition to monthly   10. Completion of Zometa x7 doses Dec 2022       Past Medical History: Nathaly has a past medical history of Breast cancer (CMS/HCC) (2019), Encounter for supervision of other normal pregnancy, third trimester (2017), History of uterine scar from previous surgery (2019), Personal history of malignant neoplasm of breast (2020), and Presence of (intrauterine) contraceptive device.  Surgical History:  Nathaly has a past surgical history that includes Other surgical history (2020);  section, classic (2019); Other surgical history (2019); and CT angio  abdomen pelvis w and or wo IV IV contrast (6/22/2020).  Social History:  Nathayl reports that she has never smoked. She has never used smokeless tobacco. She reports current alcohol use. She reports that she does not use drugs.  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    HISTORY OF PRESENT ILLNESS:  Nathaly Powell is a 34 y.o. female who is here for Breast cancer treatment follow-up and survivorship. She has no breast cancer concerns today.  She is continued compliant on the anastrazole. She denies any periods or breakthrough bleeding     She denies any skin lesions or masses, oral sores lesions or infections.      She denies any chest pain or breathing issues, no cough or short of breath.      She denies any headache issues, dizziness or loss of balance, falls. Denies any substantial issues with neuropathy     She denies any new or unexplained bone aches or pains     She reports a normal appetite. She has normal bowel movements and normal urination     She intermittent issues with sleep. She denies any substantial fatigue     Vitamin D 2,000 International Unit(s) daily and calcium chews daily, Vitamin B6 for neuropathy. She also takes magnesium.             Review of Systems - Oncology  ROS 14 points performed, See HPI for exceptions    OBJECTIVE:    VS / Pain:  /78 (BP Location: Left arm, Patient Position: Sitting, BP Cuff Size: Adult)   Pulse 64   Temp 36.9 °C (98.4 °F) (Temporal)   Wt 84.5 kg (186 lb 6.4 oz)   SpO2 98%   BMI 26.75 kg/m²    BSA: 2.04 meters squared   Pain Scale: 0  ECO- Fully active, able to carry on all pre-disease performance w/o restriction.      Performance Status:       Physical Exam  Constitutional: Well developed, awake/alert/oriented x4, no distress, alert and cooperative  EYES: Sclera clear  ENMT: mucous membranes moist, no apparent injury, no lesions seen  Head/Neck: Neck supple, no apparent injury, thyroid without mass or tenderness, No JVD, trachea midline, no bruits  Respiratory / Thoracic: Patent airways, clear to all lobes, normal breath sounds with good chest expansion, thorax symmetric.  Cardiovascular: Regular, rate and rhythm, no murmurs, 2+ equal pulses of the extremities, normal auscultated S 1and S 2  GI: Nondistended, soft, non-tender, no rebound tenderness or guarding, no masses palpable, no organomegaly, +BS, no bruits  Musculoskeletal: ROM intact, no joint swelling, normal strength, no spinal tenderness  Extremities: normal extremities, no cyanosis edema, contusions or wounds, no clubbing  Neurological: alert and oriented x4, intact senses, motor, response and reflexes, normal strength  Breast: Bilateral mastectomy BRIDGET flap reconstruction well healed, no masses, lumps, nodules.   Lymphatic: No cervical, supraclavicular, infraclavicular or axillary lymphadenopathy  Psychological: Appropriate and talkative mood and behavior  Skin: Warm and dry, no lesions, no rashes, no jaundice          Diagnostic Results   US PELVIS TRANSABDOMINAL WITH TRANSVAGINAL  Narrative: Interpreted By:  Haley Forbes,   STUDY:  US PELVIS TRANSABDOMINAL WITH TRANSVAGINAL;  2024 8:53 am      INDICATION:  Signs/Symptoms:High risk for ovarian cancer.      COMPARISON:  Pelvic ultrasound dated 10/14/2022.      ACCESSION NUMBER(S):  [id number]      ORDERING CLINICIAN:  TOMEKA LAYNE      TECHNIQUE:  Multiple multiplanar static gray scale, color and spectral waveform  sonographic images of the pelvis were obtained.  Transabdominal and  endovaginal ultrasound was performed. This examination was  interpreted at [facility].      FINDINGS:  UTERUS:  The uterus measures  2.8 x 2.8 x 6.8. The uterine myometrium appears  normal. There is appropriately positioned IUD device within the  uterine body endometrial cavity.      ENDOMETRIUM:  The endometrium measures a thickness of 0.3 cm, which is normal.      RIGHT ADNEXA:  No gross right adnexal masses are seen, no hydrosalpinx. Right ovary  is not well visualized likely due to increased bowel gas.      LEFT ADNEXA:  No gross left adnexal masses are seen, no hydrosalpinx. Left ovary is  not well visualized likely due to increased bowel gas.      CUL DE SAC:  No gross free fluid is seen in the pelvic cul-de-sac.      Impression: 1. Bilateral ovaries are not visualized and hence not assessed. This  is likely due to presence of bowel gas in the pelvis. If further  evaluation is clinically warranted, a CT or MRI study may be obtained.  2. Unremarkable sonographic evaluation of the uterus.      MACRO:  None      Signed by: Haley Forbes 2/23/2024 1:03 PM  Dictation workstation:   [id number]     Xray Bone Density Dexa 1 or more sites, Axial Skeleton [Sep 1 2023 3:33PM]  FINAL REPORT   Interpreted by:  LAKSHMI GROVES HANAUER, MD   09/01/23 15:30   Facility: [facility]     MRN: [id number]   Patient Name: MARTÍNEZ AMEZCUA     STUDY:   BONE DENSITY, DEXA 1 OR MORE SITES: AXIAL SKELETN9/1/2023 1:30 pm     INDICATION:    Hx breast cancer C50.911: Breast cancer, right M89.9: Bone disorder   N95.9: Premenopausal patient E28.39: Suppression of ovarian   secretion. The patient is a 34 y/o year old F.     COMPARISON:   Previous exam is from 08/17/2021 at  which time bone mineral density   was normal..     ACCESSION NUMBER(S):   [id number]     ORDERING CLINICIAN:   LILA GARDNER     TECHNIQUE:   BONE DENSITY, DEXA 1 OR MORE SITES: AXIAL SKELETN     FINDINGS:     SPINE L1-L4   Bone Mineral Density: 1.390   T-Score 1.7 Z-Score 1.0       LEFT FEMUR -TOTAL   Bone Mineral Density: 0.973   T-Score -0.3 Z-Score -0.6       LEFT FEMUR -NECK   Bone Mineral Density: 1.007   T-Score  -0.2 Z-Score -0.4         World Health Organization (WHO) criteria for post-menopausal,    Women:   Normal: T-score at or above -1 SD   Osteopenia: T-score between -1 and -2.5 SD   Osteoporosis: T-score at or below  -2.5 SD       10-year Fracture Risk:   Major Osteoporotic Fracture Not reported   Hip Fracture Not reported     Note: If no FRAX score is reported, it is because:   Some T-score for Spine Total or Hip Total or Femoral Neck  at or below   -2.5     This exam was performed at [facility] on a GE Lunar Prodigy Advance Dexa Unit.     IMPRESSION:   DEXA: According to World Health Organization criteria,   classification  is   normal.     Followup recommended in 2 years or sooner as clinically warranted.     All images and detailed analysis are available on the  Radiology   PACS.     Transcribed By: Interface, User   Electronically Signed  By: LAKSHMI GROVES HANAUER 09/01/23 15:30     Lab Results   Component Value Date    WBC 5.4 09/20/2023    HGB 12.9 09/20/2023    HCT 38.3 09/20/2023    MCV 87 09/20/2023     09/20/2023          Chemistry    Lab Results   Component Value Date/Time     09/20/2023 1404    K 3.7 09/20/2023 1404     09/20/2023 1404    CO2 30 09/20/2023 1404    BUN 12 09/20/2023 1404    CREATININE 0.95 09/20/2023 1404    Lab Results   Component Value Date/Time    CALCIUM 9.8 09/20/2023 1404    ALKPHOS 59 09/20/2023 1404    AST 17 09/20/2023 1404    ALT 14 09/20/2023 1404    BILITOT 0.6 09/20/2023 1404                 Assessment/Plan   Carcinoma of right breast metastatic to axillary lymph node (CMS/HCC), Clinical: cT2, cN1, cM0, G3  Nathaly was seen today for follow-up.  Diagnoses and all orders for this visit:  Carcinoma of  right breast metastatic to axillary lymph node (CMS/HCC) (Primary)  -     anastrozole (Arimidex) 1 mg tablet; Take 1 tablet (1 mg total) by mouth once daily.  -     Clinic Appointment Request Follow up; LILA GARDNER; Future  -     Infusion Appointment Request [facility] [id number] INFUSION; Future  -     Infusion Appointment Request [facility] [id number] INFUSION; Future  -     Infusion Appointment Request [facility] [id number] INFUSION; Future  -     Infusion Appointment Request [facility] [id number] INFUSION; Future  -     Infusion Appointment Request [facility] [id number] INFUSION; Future  -     Infusion Appointment Request [facility] [id number] INFUSION; Future  -     Infusion Appointment Request [facility] [id number] INFUSION; Future  -     Infusion Appointment Request [facility] [id number] INFUSION; Future  -     Infusion Appointment Request [facility] [id number] INFUSION; Future  -     Infusion Appointment Request [facility] [id number] INFUSION; Future  Monoallelic mutation of PALB2 gene  -     Clinic Appointment Request Follow up; LILA GARDNER; Future  -     Infusion Appointment Request [facility] [id number] INFUSION; Future  -     Infusion Appointment Request [facility] [id number] INFUSION; Future  -     Infusion Appointment Request [facility] [id number] INFUSION; Future  -     Infusion Appointment Request [facility] [id number] INFUSION; Future  -     Infusion Appointment Request [facility] [id number] INFUSION; Future  -     Infusion Appointment Request [facility] [id number] INFUSION; Future  -     Infusion Appointment Request [facility] [id number] INFUSION; Future  -     Infusion Appointment Request [facility] [id number] INFUSION; Future  -     Infusion Appointment Request [facility] [id number] INFUSION; Future  -     Infusion Appointment Request [facility] [id number] INFUSION; Future  Status post bilateral mastectomy  -     Clinic Appointment Request Follow up; LILA GARDNER; Future  -     Infusion Appointment Request [facility] [id number] INFUSION; Future  -     Infusion Appointment Request [facility] [id number] INFUSION; Future  -     Infusion Appointment Request [facility] [id number] INFUSION; Future  -      Infusion Appointment Request [facility] [id number] INFUSION; Future  -     Infusion Appointment Request [facility] [id number] INFUSION; Future  -     Infusion Appointment Request [facility] [id number] INFUSION; Future  -     Infusion Appointment Request [facility] [id number] INFUSION; Future  -     Infusion Appointment Request [facility] [id number] INFUSION; Future  -     Infusion Appointment Request [facility] [id number] INFUSION; Future  -     Infusion Appointment Request [facility] [id number] INFUSION; Future  Aromatase inhibitor use  -     Clinic Appointment Request Follow up; LILA GARDNER; Future  -     Infusion Appointment Request [facility] [id number] INFUSION; Future  -     Infusion Appointment Request [facility] [id number] INFUSION; Future  -     Infusion Appointment Request [facility] [id number] INFUSION; Future  -     Infusion Appointment Request [facility] [id number] INFUSION; Future  -     Infusion Appointment Request [facility] [id number] INFUSION; Future  -     Infusion Appointment Request [facility] [id number] INFUSION; Future  -     Infusion Appointment Request [facility] [id number] INFUSION; Future  -     Infusion Appointment Request [facility] [id number] INFUSION; Future  -     Infusion Appointment Request [facility] [id number] INFUSION; Future  -     Infusion Appointment Request [facility] [id number] INFUSION; Future  Suppression of ovarian secretion  -     Clinic Appointment Request Follow up; LILA GARDNER; Future  -     Infusion Appointment Request [facility] [id number] INFUSION; Future  -     Infusion Appointment Request [facility] [id number] INFUSION; Future  -     Infusion Appointment Request [facility] [id number] INFUSION; Future  -     Infusion Appointment Request [facility] [id number] INFUSION; Future  -     Infusion Appointment Request [facility] [id number] INFUSION; Future  -     Infusion Appointment Request [facility] [id number] INFUSION; Future  -     Infusion Appointment Request [facility] [id number] INFUSION; Future  -     Infusion Appointment Request [facility] [id number] INFUSION; Future  -     Infusion Appointment Request [facility] [id number] INFUSION; Future  -     Infusion Appointment Request [facility] [id number] INFUSION;  Future  Encounter for follow-up surveillance of breast cancer  -     Clinic Appointment Request Follow up; LILA GARDNER; Future  -     Infusion Appointment Request [facility] [id number] INFUSION; Future  -     Infusion Appointment Request [facility] [id number] INFUSION; Future  -     Infusion Appointment Request [facility] [id number] INFUSION; Future  -     Infusion Appointment Request [facility] [id number] INFUSION; Future  -     Infusion Appointment Request [facility] [id number] INFUSION; Future  -     Infusion Appointment Request [facility] [id number] INFUSION; Future  -     Infusion Appointment Request [facility] [id number] INFUSION; Future  -     Infusion Appointment Request [facility] [id number] INFUSION; Future  -     Infusion Appointment Request [facility] [id number] INFUSION; Future  -     Infusion Appointment Request [facility] [id number] INFUSION; Future  Other orders  -     Nursing Communication    Problem List Items Addressed This Visit       Carcinoma of right breast metastatic to axillary lymph node (CMS/HCC) - Primary    Relevant Medications    anastrozole (Arimidex) 1 mg tablet    Other Relevant Orders    Clinic Appointment Request Follow up; LILA GARDNER    Infusion Appointment Request [facility] [id number] INFUSION    Infusion Appointment Request SCC [id number] INFUSION    Infusion Appointment Request SCC [id number] INFUSION    Infusion Appointment Request SCC [id number] INFUSION    Infusion Appointment Request SCC [id number] INFUSION    Infusion Appointment Request SCC [id number] INFUSION    Infusion Appointment Request SCC [id number] INFUSION    Infusion Appointment Request SCC [id number] INFUSION    Infusion Appointment Request SCC [id number] INFUSION    Infusion Appointment Request SCC [id number] INFUSION    Status post bilateral mastectomy    Relevant Orders    Clinic Appointment Request Follow up; LILA GARDNER    Infusion Appointment Request SCC [id number] INFUSION    Infusion Appointment Request SCC [id number] INFUSION    Infusion Appointment Request SCC [id number] INFUSION    Infusion Appointment Request SCC  [id number] INFUSION    Infusion Appointment Request SCC [id number] INFUSION    Infusion Appointment Request SCC [id number] INFUSION    Infusion Appointment Request SCC [id number] INFUSION    Infusion Appointment Request SCC [id number] INFUSION    Infusion Appointment Request SCC [id number] INFUSION    Infusion Appointment Request SCC [id number] INFUSION    Monoallelic mutation of PALB2 gene    Relevant Orders    Clinic Appointment Request Follow up; TRINH GARDNERIE R    Infusion Appointment Request SCC [id number] INFUSION    Infusion Appointment Request SCC [id number] INFUSION    Infusion Appointment Request SCC [id number] INFUSION    Infusion Appointment Request SCC [id number] INFUSION    Infusion Appointment Request SCC [id number] INFUSION    Infusion Appointment Request SCC [id number] INFUSION    Infusion Appointment Request SCC [id number] INFUSION    Infusion Appointment Request SCC [id number] INFUSION    Infusion Appointment Request SCC [id number] INFUSION    Infusion Appointment Request SCC [id number] INFUSION    Aromatase inhibitor use    Relevant Orders    Clinic Appointment Request Follow up; TRINH GARDNERIE R    Infusion Appointment Request SCC [id number] INFUSION    Infusion Appointment Request SCC [id number] INFUSION    Infusion Appointment Request SCC [id number] INFUSION    Infusion Appointment Request SCC [id number] INFUSION    Infusion Appointment Request SCC [id number] INFUSION    Infusion Appointment Request SCC [id number] INFUSION    Infusion Appointment Request SCC [id number] INFUSION    Infusion Appointment Request SCC [id number] INFUSION    Infusion Appointment Request SCC [id number] INFUSION    Infusion Appointment Request SCC [id number] INFUSION    Suppression of ovarian secretion    Relevant Orders    Clinic Appointment Request Follow up; LILA GARDNER R    Infusion Appointment Request SCC [id number] INFUSION    Infusion Appointment Request SCC [id number] INFUSION    Infusion Appointment Request SCC [id number] INFUSION    Infusion Appointment Request SCC [id number] INFUSION     Infusion Appointment Request SCC [id number] INFUSION    Infusion Appointment Request SCC [id number] INFUSION    Infusion Appointment Request SCC [id number] INFUSION    Infusion Appointment Request SCC [id number] INFUSION    Infusion Appointment Request SCC [id number] INFUSION    Infusion Appointment Request SCC [id number] INFUSION    Encounter for follow-up surveillance of breast cancer    Relevant Orders    Clinic Appointment Request Follow up; LILA GARDNER    Infusion Appointment Request SCC [id number] INFUSION    Infusion Appointment Request SCC [id number] INFUSION    Infusion Appointment Request SCC [id number] INFUSION    Infusion Appointment Request SCC [id number] INFUSION    Infusion Appointment Request SCC [id number] INFUSION    Infusion Appointment Request SCC [id number] INFUSION    Infusion Appointment Request SCC [id number] INFUSION    Infusion Appointment Request SCC [id number] INFUSION    Infusion Appointment Request SCC [id number] INFUSION    Infusion Appointment Request SCC [id number] INFUSION     T2N1M0 HR + Invasive Ductal Carcinoma Right, Stage IIIA  s/p Neoadjuvant chemo with AC-T in 9/2019, Bilateral mastectomy with right SLN performed 11/1/19. She had microscopic residual disease, LVI and 0/3 SLN. There was no residual disease in the node with  biopsy site changes. Ovarian suppression started 11/19/19. Anastrozole commenced 12/2019 and Zometa every 6 months for 3 years commenced 12/17/19. Radiation therapy 1/2020. She had a Genetics consult with testing showing a mutation in the PALB2 gene,  and a variant of unknown significance in the CHEK2 gene; because of this she has annual surveillance and pelvic ultrasound with Dr. Haskins.      -She is continues to tolerate Anastrozole. Completion of adjuvant Zometa Dec 2022, received 7 doses    -Will complete 5 year course of ovarian suppression in Dec 2024. My preference is transition to Tamoxifen for additional 5 years. We also have discussed could stay on Anastrozole and keep a close eye on  hormone levels. She will think about this and let me know her preference at our next Office visit      There is no evidence of breast cancer recurrence based on her clinical exam today.      Bone Health  Updated DEXA showing normal density Sept 2023. Continued encouragement of weight bearing exercises, ca and vitamin D     General Health Maintenance   PCP Dr Meza annually and as needed  Is established with GYN     At least 20 minutes of direct consultation was spent with the patient today reviewing her cancer care plan, educating and answering questions regarding ongoing follow up, greater than 50% in counseling and coordination of care.      Treatment Plan:  Venous Access Orders  Goserelin, Every 28 Days          Thank you for the opportunity to be involved in the care of Nathaly Powell.   We discussed the clinical significance of diagnosis, goals of care and treatment plan in detail.   Please do not hesitate to reach out with any questions. Thank you.     -------------------------------------------------------------------------------------------------------------------------------  Emerita Griffin MSN, APRN, FNP-C  [facility]  Division of Medical Oncology- Breast   Collaborating Physician Dr. Tunde Walker   [facility] Eli Rivera, Tata [place] [address]  Phone: [phone]  Fax: [phone]  Available via Secure Chat    Confidential Peer Review Document-  Privilege  Privileged Pursuant to [place] Revised Code Section 2305.24, .25, .251 & .252

## 2024-03-06 ENCOUNTER — APPOINTMENT (OUTPATIENT)
Dept: HEMATOLOGY/ONCOLOGY | Facility: CLINIC | Age: 34
End: 2024-03-06
Payer: COMMERCIAL

## 2024-03-27 ENCOUNTER — INFUSION (OUTPATIENT)
Dept: HEMATOLOGY/ONCOLOGY | Facility: CLINIC | Age: 34
End: 2024-03-27
Payer: COMMERCIAL

## 2024-03-27 ENCOUNTER — APPOINTMENT (OUTPATIENT)
Dept: HEMATOLOGY/ONCOLOGY | Facility: CLINIC | Age: 34
End: 2024-03-27
Payer: COMMERCIAL

## 2024-03-27 DIAGNOSIS — E28.39 SUPPRESSION OF OVARIAN SECRETION: ICD-10-CM

## 2024-03-27 DIAGNOSIS — Z08 ENCOUNTER FOR FOLLOW-UP SURVEILLANCE OF BREAST CANCER: ICD-10-CM

## 2024-03-27 DIAGNOSIS — Z15.01 MONOALLELIC MUTATION OF PALB2 GENE: ICD-10-CM

## 2024-03-27 DIAGNOSIS — Z90.13 STATUS POST BILATERAL MASTECTOMY: ICD-10-CM

## 2024-03-27 DIAGNOSIS — Z15.89 MONOALLELIC MUTATION OF PALB2 GENE: ICD-10-CM

## 2024-03-27 DIAGNOSIS — Z79.811 AROMATASE INHIBITOR USE: ICD-10-CM

## 2024-03-27 DIAGNOSIS — C50.911 CARCINOMA OF RIGHT BREAST METASTATIC TO AXILLARY LYMPH NODE (MULTI): ICD-10-CM

## 2024-03-27 DIAGNOSIS — Z85.3 ENCOUNTER FOR FOLLOW-UP SURVEILLANCE OF BREAST CANCER: ICD-10-CM

## 2024-03-27 DIAGNOSIS — Z15.09 MONOALLELIC MUTATION OF PALB2 GENE: ICD-10-CM

## 2024-03-27 DIAGNOSIS — C77.3 CARCINOMA OF RIGHT BREAST METASTATIC TO AXILLARY LYMPH NODE (MULTI): ICD-10-CM

## 2024-03-27 PROCEDURE — 2500000004 HC RX 250 GENERAL PHARMACY W/ HCPCS (ALT 636 FOR OP/ED): Mod: JZ | Performed by: NURSE PRACTITIONER

## 2024-03-27 PROCEDURE — 2500000005 HC RX 250 GENERAL PHARMACY W/O HCPCS: Performed by: NURSE PRACTITIONER

## 2024-03-27 PROCEDURE — 96402 CHEMO HORMON ANTINEOPL SQ/IM: CPT

## 2024-03-27 PROCEDURE — 96372 THER/PROPH/DIAG INJ SC/IM: CPT

## 2024-03-27 RX ORDER — EPINEPHRINE 0.3 MG/.3ML
0.3 INJECTION SUBCUTANEOUS EVERY 5 MIN PRN
Status: CANCELLED | OUTPATIENT
Start: 2024-04-24

## 2024-03-27 RX ORDER — FAMOTIDINE 10 MG/ML
20 INJECTION INTRAVENOUS ONCE AS NEEDED
Status: CANCELLED | OUTPATIENT
Start: 2024-04-24

## 2024-03-27 RX ORDER — LIDOCAINE HYDROCHLORIDE 10 MG/ML
2 INJECTION, SOLUTION EPIDURAL; INFILTRATION; INTRACAUDAL; PERINEURAL ONCE
Status: COMPLETED | OUTPATIENT
Start: 2024-03-27 | End: 2024-03-27

## 2024-03-27 RX ORDER — DIPHENHYDRAMINE HYDROCHLORIDE 50 MG/ML
50 INJECTION INTRAMUSCULAR; INTRAVENOUS AS NEEDED
Status: CANCELLED | OUTPATIENT
Start: 2024-04-24

## 2024-03-27 RX ORDER — ALBUTEROL SULFATE 0.83 MG/ML
3 SOLUTION RESPIRATORY (INHALATION) AS NEEDED
Status: CANCELLED | OUTPATIENT
Start: 2024-04-24

## 2024-03-27 RX ORDER — LIDOCAINE HYDROCHLORIDE 10 MG/ML
2 INJECTION, SOLUTION EPIDURAL; INFILTRATION; INTRACAUDAL; PERINEURAL ONCE
Status: CANCELLED | OUTPATIENT
Start: 2024-04-24

## 2024-03-27 RX ADMIN — GOSERELIN ACETATE 3.6 MG: 3.6 IMPLANT SUBCUTANEOUS at 15:14

## 2024-03-27 RX ADMIN — LIDOCAINE HYDROCHLORIDE 20 MG: 10 INJECTION, SOLUTION EPIDURAL; INFILTRATION; INTRACAUDAL; PERINEURAL at 15:14

## 2024-04-02 ENCOUNTER — LAB REQUISITION (OUTPATIENT)
Dept: LAB | Facility: HOSPITAL | Age: 34
End: 2024-04-02
Payer: COMMERCIAL

## 2024-04-02 DIAGNOSIS — R30.0 DYSURIA: ICD-10-CM

## 2024-04-02 PROCEDURE — 87086 URINE CULTURE/COLONY COUNT: CPT

## 2024-04-03 ENCOUNTER — APPOINTMENT (OUTPATIENT)
Dept: HEMATOLOGY/ONCOLOGY | Facility: CLINIC | Age: 34
End: 2024-04-03
Payer: COMMERCIAL

## 2024-04-03 LAB — BACTERIA UR CULT: NORMAL

## 2024-04-24 ENCOUNTER — LAB (OUTPATIENT)
Dept: LAB | Facility: CLINIC | Age: 34
End: 2024-04-24
Payer: COMMERCIAL

## 2024-04-24 ENCOUNTER — INFUSION (OUTPATIENT)
Dept: HEMATOLOGY/ONCOLOGY | Facility: CLINIC | Age: 34
End: 2024-04-24
Payer: COMMERCIAL

## 2024-04-24 VITALS
BODY MASS INDEX: 27.24 KG/M2 | WEIGHT: 189.82 LBS | HEART RATE: 66 BPM | RESPIRATION RATE: 18 BRPM | TEMPERATURE: 96.8 F | DIASTOLIC BLOOD PRESSURE: 64 MMHG | SYSTOLIC BLOOD PRESSURE: 100 MMHG | OXYGEN SATURATION: 100 %

## 2024-04-24 DIAGNOSIS — Z15.01 MONOALLELIC MUTATION OF PALB2 GENE: ICD-10-CM

## 2024-04-24 DIAGNOSIS — Z15.89 MONOALLELIC MUTATION OF PALB2 GENE: ICD-10-CM

## 2024-04-24 DIAGNOSIS — C50.911 CARCINOMA OF RIGHT BREAST METASTATIC TO AXILLARY LYMPH NODE (MULTI): ICD-10-CM

## 2024-04-24 DIAGNOSIS — Z90.13 STATUS POST BILATERAL MASTECTOMY: ICD-10-CM

## 2024-04-24 DIAGNOSIS — Z85.3 ENCOUNTER FOR FOLLOW-UP SURVEILLANCE OF BREAST CANCER: ICD-10-CM

## 2024-04-24 DIAGNOSIS — C77.3 CARCINOMA OF RIGHT BREAST METASTATIC TO AXILLARY LYMPH NODE (MULTI): ICD-10-CM

## 2024-04-24 DIAGNOSIS — Z79.811 AROMATASE INHIBITOR USE: ICD-10-CM

## 2024-04-24 DIAGNOSIS — Z15.09 MONOALLELIC MUTATION OF PALB2 GENE: ICD-10-CM

## 2024-04-24 DIAGNOSIS — E28.39 SUPPRESSION OF OVARIAN SECRETION: ICD-10-CM

## 2024-04-24 DIAGNOSIS — Z08 ENCOUNTER FOR FOLLOW-UP SURVEILLANCE OF BREAST CANCER: ICD-10-CM

## 2024-04-24 LAB
BASOPHILS # BLD AUTO: 0.02 X10*3/UL (ref 0–0.1)
BASOPHILS NFR BLD AUTO: 0.4 %
EOSINOPHIL # BLD AUTO: 0.08 X10*3/UL (ref 0–0.7)
EOSINOPHIL NFR BLD AUTO: 1.6 %
ERYTHROCYTE [DISTWIDTH] IN BLOOD BY AUTOMATED COUNT: 12.1 % (ref 11.5–14.5)
HCT VFR BLD AUTO: 37.5 % (ref 36–46)
HGB BLD-MCNC: 12.6 G/DL (ref 12–16)
IMM GRANULOCYTES # BLD AUTO: 0 X10*3/UL (ref 0–0.7)
IMM GRANULOCYTES NFR BLD AUTO: 0 % (ref 0–0.9)
LYMPHOCYTES # BLD AUTO: 1.92 X10*3/UL (ref 1.2–4.8)
LYMPHOCYTES NFR BLD AUTO: 38.6 %
MCH RBC QN AUTO: 29.2 PG (ref 26–34)
MCHC RBC AUTO-ENTMCNC: 33.6 G/DL (ref 32–36)
MCV RBC AUTO: 87 FL (ref 80–100)
MONOCYTES # BLD AUTO: 0.33 X10*3/UL (ref 0.1–1)
MONOCYTES NFR BLD AUTO: 6.6 %
NEUTROPHILS # BLD AUTO: 2.63 X10*3/UL (ref 1.2–7.7)
NEUTROPHILS NFR BLD AUTO: 52.8 %
NRBC BLD-RTO: NORMAL /100{WBCS}
PLATELET # BLD AUTO: 175 X10*3/UL (ref 150–450)
RBC # BLD AUTO: 4.31 X10*6/UL (ref 4–5.2)
WBC # BLD AUTO: 5 X10*3/UL (ref 4.4–11.3)

## 2024-04-24 PROCEDURE — 83001 ASSAY OF GONADOTROPIN (FSH): CPT

## 2024-04-24 PROCEDURE — 2500000005 HC RX 250 GENERAL PHARMACY W/O HCPCS: Performed by: NURSE PRACTITIONER

## 2024-04-24 PROCEDURE — 82670 ASSAY OF TOTAL ESTRADIOL: CPT

## 2024-04-24 PROCEDURE — 80053 COMPREHEN METABOLIC PANEL: CPT

## 2024-04-24 PROCEDURE — 85025 COMPLETE CBC W/AUTO DIFF WBC: CPT

## 2024-04-24 PROCEDURE — 96372 THER/PROPH/DIAG INJ SC/IM: CPT

## 2024-04-24 PROCEDURE — 96402 CHEMO HORMON ANTINEOPL SQ/IM: CPT

## 2024-04-24 PROCEDURE — 36415 COLL VENOUS BLD VENIPUNCTURE: CPT

## 2024-04-24 PROCEDURE — 2500000004 HC RX 250 GENERAL PHARMACY W/ HCPCS (ALT 636 FOR OP/ED): Mod: JZ | Performed by: NURSE PRACTITIONER

## 2024-04-24 RX ORDER — LIDOCAINE HYDROCHLORIDE 10 MG/ML
2 INJECTION, SOLUTION EPIDURAL; INFILTRATION; INTRACAUDAL; PERINEURAL ONCE
Status: CANCELLED | OUTPATIENT
Start: 2024-05-22

## 2024-04-24 RX ORDER — FAMOTIDINE 10 MG/ML
20 INJECTION INTRAVENOUS ONCE AS NEEDED
Status: CANCELLED | OUTPATIENT
Start: 2024-05-22

## 2024-04-24 RX ORDER — LIDOCAINE HYDROCHLORIDE 10 MG/ML
2 INJECTION, SOLUTION EPIDURAL; INFILTRATION; INTRACAUDAL; PERINEURAL ONCE
Status: COMPLETED | OUTPATIENT
Start: 2024-04-24 | End: 2024-04-24

## 2024-04-24 RX ORDER — ALBUTEROL SULFATE 0.83 MG/ML
3 SOLUTION RESPIRATORY (INHALATION) AS NEEDED
Status: DISCONTINUED | OUTPATIENT
Start: 2024-04-24 | End: 2024-04-24 | Stop reason: HOSPADM

## 2024-04-24 RX ORDER — DIPHENHYDRAMINE HYDROCHLORIDE 50 MG/ML
50 INJECTION INTRAMUSCULAR; INTRAVENOUS AS NEEDED
Status: DISCONTINUED | OUTPATIENT
Start: 2024-04-24 | End: 2024-04-24 | Stop reason: HOSPADM

## 2024-04-24 RX ORDER — FAMOTIDINE 10 MG/ML
20 INJECTION INTRAVENOUS ONCE AS NEEDED
Status: DISCONTINUED | OUTPATIENT
Start: 2024-04-24 | End: 2024-04-24 | Stop reason: HOSPADM

## 2024-04-24 RX ORDER — EPINEPHRINE 0.3 MG/.3ML
0.3 INJECTION SUBCUTANEOUS EVERY 5 MIN PRN
Status: CANCELLED | OUTPATIENT
Start: 2024-05-22

## 2024-04-24 RX ORDER — DIPHENHYDRAMINE HYDROCHLORIDE 50 MG/ML
50 INJECTION INTRAMUSCULAR; INTRAVENOUS AS NEEDED
Status: CANCELLED | OUTPATIENT
Start: 2024-05-22

## 2024-04-24 RX ORDER — ALBUTEROL SULFATE 0.83 MG/ML
3 SOLUTION RESPIRATORY (INHALATION) AS NEEDED
Status: CANCELLED | OUTPATIENT
Start: 2024-05-22

## 2024-04-24 RX ORDER — EPINEPHRINE 0.3 MG/.3ML
0.3 INJECTION SUBCUTANEOUS EVERY 5 MIN PRN
Status: DISCONTINUED | OUTPATIENT
Start: 2024-04-24 | End: 2024-04-24 | Stop reason: HOSPADM

## 2024-04-24 RX ADMIN — LIDOCAINE HYDROCHLORIDE 20 MG: 10 INJECTION, SOLUTION EPIDURAL; INFILTRATION; INTRACAUDAL; PERINEURAL at 15:49

## 2024-04-24 RX ADMIN — GOSERELIN ACETATE 3.6 MG: 3.6 IMPLANT SUBCUTANEOUS at 15:50

## 2024-04-24 ASSESSMENT — PAIN SCALES - GENERAL
PAINLEVEL_OUTOF10: 0 - NO PAIN
PAINLEVEL: 0-NO PAIN

## 2024-04-25 LAB
ALBUMIN SERPL BCP-MCNC: 4.4 G/DL (ref 3.4–5)
ALP SERPL-CCNC: 68 U/L (ref 33–110)
ALT SERPL W P-5'-P-CCNC: 21 U/L (ref 7–45)
ANION GAP SERPL CALC-SCNC: 15 MMOL/L (ref 10–20)
AST SERPL W P-5'-P-CCNC: 25 U/L (ref 9–39)
BILIRUB SERPL-MCNC: 0.5 MG/DL (ref 0–1.2)
BUN SERPL-MCNC: 15 MG/DL (ref 6–23)
CALCIUM SERPL-MCNC: 10.1 MG/DL (ref 8.6–10.6)
CHLORIDE SERPL-SCNC: 100 MMOL/L (ref 98–107)
CO2 SERPL-SCNC: 29 MMOL/L (ref 21–32)
CREAT SERPL-MCNC: 0.96 MG/DL (ref 0.5–1.05)
EGFRCR SERPLBLD CKD-EPI 2021: 79 ML/MIN/1.73M*2
ESTRADIOL SERPL-MCNC: <19 PG/ML
FSH SERPL-ACNC: 6.2 IU/L
GLUCOSE SERPL-MCNC: 98 MG/DL (ref 74–99)
POTASSIUM SERPL-SCNC: 4.2 MMOL/L (ref 3.5–5.3)
PROT SERPL-MCNC: 7.6 G/DL (ref 6.4–8.2)
SODIUM SERPL-SCNC: 140 MMOL/L (ref 136–145)

## 2024-05-08 ENCOUNTER — E-VISIT (OUTPATIENT)
Dept: HEMATOLOGY/ONCOLOGY | Facility: CLINIC | Age: 34
End: 2024-05-08
Payer: COMMERCIAL

## 2024-05-08 DIAGNOSIS — C50.911 CARCINOMA OF RIGHT BREAST METASTATIC TO AXILLARY LYMPH NODE (MULTI): ICD-10-CM

## 2024-05-08 DIAGNOSIS — C77.3 CARCINOMA OF RIGHT BREAST METASTATIC TO AXILLARY LYMPH NODE (MULTI): ICD-10-CM

## 2024-05-08 RX ORDER — ANASTROZOLE 1 MG/1
1 TABLET ORAL DAILY
Qty: 90 TABLET | Refills: 3 | Status: SHIPPED | OUTPATIENT
Start: 2024-05-08 | End: 2025-05-08

## 2024-05-22 ENCOUNTER — INFUSION (OUTPATIENT)
Dept: HEMATOLOGY/ONCOLOGY | Facility: CLINIC | Age: 34
End: 2024-05-22
Payer: COMMERCIAL

## 2024-05-22 VITALS
SYSTOLIC BLOOD PRESSURE: 98 MMHG | DIASTOLIC BLOOD PRESSURE: 65 MMHG | RESPIRATION RATE: 18 BRPM | OXYGEN SATURATION: 98 % | BODY MASS INDEX: 27.41 KG/M2 | HEART RATE: 64 BPM | WEIGHT: 191 LBS | TEMPERATURE: 97.3 F

## 2024-05-22 DIAGNOSIS — Z15.89 MONOALLELIC MUTATION OF PALB2 GENE: ICD-10-CM

## 2024-05-22 DIAGNOSIS — Z79.811 AROMATASE INHIBITOR USE: ICD-10-CM

## 2024-05-22 DIAGNOSIS — E28.39 SUPPRESSION OF OVARIAN SECRETION: ICD-10-CM

## 2024-05-22 DIAGNOSIS — C50.911 CARCINOMA OF RIGHT BREAST METASTATIC TO AXILLARY LYMPH NODE (MULTI): ICD-10-CM

## 2024-05-22 DIAGNOSIS — Z08 ENCOUNTER FOR FOLLOW-UP SURVEILLANCE OF BREAST CANCER: ICD-10-CM

## 2024-05-22 DIAGNOSIS — C77.3 CARCINOMA OF RIGHT BREAST METASTATIC TO AXILLARY LYMPH NODE (MULTI): ICD-10-CM

## 2024-05-22 DIAGNOSIS — Z15.09 MONOALLELIC MUTATION OF PALB2 GENE: ICD-10-CM

## 2024-05-22 DIAGNOSIS — Z15.01 MONOALLELIC MUTATION OF PALB2 GENE: ICD-10-CM

## 2024-05-22 DIAGNOSIS — Z85.3 ENCOUNTER FOR FOLLOW-UP SURVEILLANCE OF BREAST CANCER: ICD-10-CM

## 2024-05-22 DIAGNOSIS — Z90.13 STATUS POST BILATERAL MASTECTOMY: ICD-10-CM

## 2024-05-22 PROCEDURE — 96402 CHEMO HORMON ANTINEOPL SQ/IM: CPT

## 2024-05-22 PROCEDURE — 96372 THER/PROPH/DIAG INJ SC/IM: CPT

## 2024-05-22 PROCEDURE — 2500000005 HC RX 250 GENERAL PHARMACY W/O HCPCS: Performed by: NURSE PRACTITIONER

## 2024-05-22 PROCEDURE — 2500000004 HC RX 250 GENERAL PHARMACY W/ HCPCS (ALT 636 FOR OP/ED): Mod: JZ | Performed by: NURSE PRACTITIONER

## 2024-05-22 RX ORDER — ALBUTEROL SULFATE 0.83 MG/ML
3 SOLUTION RESPIRATORY (INHALATION) AS NEEDED
OUTPATIENT
Start: 2024-06-19

## 2024-05-22 RX ORDER — FAMOTIDINE 10 MG/ML
20 INJECTION INTRAVENOUS ONCE AS NEEDED
OUTPATIENT
Start: 2024-06-19

## 2024-05-22 RX ORDER — EPINEPHRINE 0.3 MG/.3ML
0.3 INJECTION SUBCUTANEOUS EVERY 5 MIN PRN
OUTPATIENT
Start: 2024-06-19

## 2024-05-22 RX ORDER — DIPHENHYDRAMINE HYDROCHLORIDE 50 MG/ML
50 INJECTION INTRAMUSCULAR; INTRAVENOUS AS NEEDED
OUTPATIENT
Start: 2024-06-19

## 2024-05-22 RX ORDER — LIDOCAINE HYDROCHLORIDE 10 MG/ML
2 INJECTION, SOLUTION EPIDURAL; INFILTRATION; INTRACAUDAL; PERINEURAL ONCE
OUTPATIENT
Start: 2024-06-19

## 2024-05-22 RX ORDER — LIDOCAINE HYDROCHLORIDE 10 MG/ML
2 INJECTION, SOLUTION EPIDURAL; INFILTRATION; INTRACAUDAL; PERINEURAL ONCE
Status: COMPLETED | OUTPATIENT
Start: 2024-05-22 | End: 2024-05-22

## 2024-05-22 RX ADMIN — GOSERELIN ACETATE 3.6 MG: 3.6 IMPLANT SUBCUTANEOUS at 16:21

## 2024-05-22 RX ADMIN — LIDOCAINE HYDROCHLORIDE 20 MG: 10 INJECTION, SOLUTION EPIDURAL; INFILTRATION; INTRACAUDAL; PERINEURAL at 16:08

## 2024-05-22 ASSESSMENT — PAIN SCALES - GENERAL: PAINLEVEL: 0-NO PAIN

## 2024-06-26 ENCOUNTER — INFUSION (OUTPATIENT)
Dept: HEMATOLOGY/ONCOLOGY | Facility: CLINIC | Age: 34
End: 2024-06-26
Payer: COMMERCIAL

## 2024-06-26 VITALS
WEIGHT: 190.7 LBS | RESPIRATION RATE: 16 BRPM | TEMPERATURE: 97 F | DIASTOLIC BLOOD PRESSURE: 70 MMHG | SYSTOLIC BLOOD PRESSURE: 105 MMHG | HEART RATE: 88 BPM | OXYGEN SATURATION: 97 % | BODY MASS INDEX: 27.36 KG/M2

## 2024-06-26 DIAGNOSIS — Z85.3 ENCOUNTER FOR FOLLOW-UP SURVEILLANCE OF BREAST CANCER: ICD-10-CM

## 2024-06-26 DIAGNOSIS — C77.3 CARCINOMA OF RIGHT BREAST METASTATIC TO AXILLARY LYMPH NODE (MULTI): ICD-10-CM

## 2024-06-26 DIAGNOSIS — Z15.09 MONOALLELIC MUTATION OF PALB2 GENE: ICD-10-CM

## 2024-06-26 DIAGNOSIS — Z08 ENCOUNTER FOR FOLLOW-UP SURVEILLANCE OF BREAST CANCER: ICD-10-CM

## 2024-06-26 DIAGNOSIS — Z90.13 STATUS POST BILATERAL MASTECTOMY: ICD-10-CM

## 2024-06-26 DIAGNOSIS — Z15.01 MONOALLELIC MUTATION OF PALB2 GENE: ICD-10-CM

## 2024-06-26 DIAGNOSIS — Z15.89 MONOALLELIC MUTATION OF PALB2 GENE: ICD-10-CM

## 2024-06-26 DIAGNOSIS — Z79.811 AROMATASE INHIBITOR USE: ICD-10-CM

## 2024-06-26 DIAGNOSIS — E28.39 SUPPRESSION OF OVARIAN SECRETION: ICD-10-CM

## 2024-06-26 DIAGNOSIS — C50.911 CARCINOMA OF RIGHT BREAST METASTATIC TO AXILLARY LYMPH NODE (MULTI): ICD-10-CM

## 2024-06-26 PROCEDURE — 96402 CHEMO HORMON ANTINEOPL SQ/IM: CPT

## 2024-06-26 PROCEDURE — 2500000005 HC RX 250 GENERAL PHARMACY W/O HCPCS: Performed by: NURSE PRACTITIONER

## 2024-06-26 PROCEDURE — 2500000004 HC RX 250 GENERAL PHARMACY W/ HCPCS (ALT 636 FOR OP/ED): Mod: JZ | Performed by: NURSE PRACTITIONER

## 2024-06-26 PROCEDURE — 96372 THER/PROPH/DIAG INJ SC/IM: CPT

## 2024-06-26 RX ORDER — LIDOCAINE HYDROCHLORIDE 10 MG/ML
2 INJECTION, SOLUTION EPIDURAL; INFILTRATION; INTRACAUDAL; PERINEURAL ONCE
OUTPATIENT
Start: 2024-07-17

## 2024-06-26 RX ORDER — FAMOTIDINE 10 MG/ML
20 INJECTION INTRAVENOUS ONCE AS NEEDED
Status: DISCONTINUED | OUTPATIENT
Start: 2024-06-26 | End: 2024-06-26 | Stop reason: HOSPADM

## 2024-06-26 RX ORDER — ALBUTEROL SULFATE 0.83 MG/ML
3 SOLUTION RESPIRATORY (INHALATION) AS NEEDED
Status: DISCONTINUED | OUTPATIENT
Start: 2024-06-26 | End: 2024-06-26 | Stop reason: HOSPADM

## 2024-06-26 RX ORDER — EPINEPHRINE 0.3 MG/.3ML
0.3 INJECTION SUBCUTANEOUS EVERY 5 MIN PRN
Status: DISCONTINUED | OUTPATIENT
Start: 2024-06-26 | End: 2024-06-26 | Stop reason: HOSPADM

## 2024-06-26 RX ORDER — FAMOTIDINE 10 MG/ML
20 INJECTION INTRAVENOUS ONCE AS NEEDED
OUTPATIENT
Start: 2024-07-17

## 2024-06-26 RX ORDER — DIPHENHYDRAMINE HYDROCHLORIDE 50 MG/ML
50 INJECTION INTRAMUSCULAR; INTRAVENOUS AS NEEDED
OUTPATIENT
Start: 2024-07-17

## 2024-06-26 RX ORDER — ALBUTEROL SULFATE 0.83 MG/ML
3 SOLUTION RESPIRATORY (INHALATION) AS NEEDED
OUTPATIENT
Start: 2024-07-17

## 2024-06-26 RX ORDER — LIDOCAINE HYDROCHLORIDE 10 MG/ML
2 INJECTION, SOLUTION EPIDURAL; INFILTRATION; INTRACAUDAL; PERINEURAL ONCE
Status: COMPLETED | OUTPATIENT
Start: 2024-06-26 | End: 2024-06-26

## 2024-06-26 RX ORDER — EPINEPHRINE 0.3 MG/.3ML
0.3 INJECTION SUBCUTANEOUS EVERY 5 MIN PRN
OUTPATIENT
Start: 2024-07-17

## 2024-06-26 RX ORDER — DIPHENHYDRAMINE HYDROCHLORIDE 50 MG/ML
50 INJECTION INTRAMUSCULAR; INTRAVENOUS AS NEEDED
Status: DISCONTINUED | OUTPATIENT
Start: 2024-06-26 | End: 2024-06-26 | Stop reason: HOSPADM

## 2024-06-26 ASSESSMENT — PAIN SCALES - GENERAL
PAINLEVEL: 0-NO PAIN
PAINLEVEL_OUTOF10: 0 - NO PAIN

## 2024-07-24 ENCOUNTER — INFUSION (OUTPATIENT)
Dept: HEMATOLOGY/ONCOLOGY | Facility: CLINIC | Age: 34
End: 2024-07-24
Payer: COMMERCIAL

## 2024-07-24 VITALS
OXYGEN SATURATION: 98 % | HEART RATE: 75 BPM | RESPIRATION RATE: 18 BRPM | TEMPERATURE: 96.8 F | WEIGHT: 192.57 LBS | SYSTOLIC BLOOD PRESSURE: 99 MMHG | BODY MASS INDEX: 27.63 KG/M2 | DIASTOLIC BLOOD PRESSURE: 65 MMHG

## 2024-07-24 DIAGNOSIS — E28.39 SUPPRESSION OF OVARIAN SECRETION: ICD-10-CM

## 2024-07-24 DIAGNOSIS — C77.3 CARCINOMA OF RIGHT BREAST METASTATIC TO AXILLARY LYMPH NODE (MULTI): ICD-10-CM

## 2024-07-24 DIAGNOSIS — Z79.811 AROMATASE INHIBITOR USE: ICD-10-CM

## 2024-07-24 DIAGNOSIS — Z85.3 ENCOUNTER FOR FOLLOW-UP SURVEILLANCE OF BREAST CANCER: ICD-10-CM

## 2024-07-24 DIAGNOSIS — Z08 ENCOUNTER FOR FOLLOW-UP SURVEILLANCE OF BREAST CANCER: ICD-10-CM

## 2024-07-24 DIAGNOSIS — C50.911 CARCINOMA OF RIGHT BREAST METASTATIC TO AXILLARY LYMPH NODE (MULTI): ICD-10-CM

## 2024-07-24 DIAGNOSIS — Z15.01 MONOALLELIC MUTATION OF PALB2 GENE: ICD-10-CM

## 2024-07-24 DIAGNOSIS — Z15.89 MONOALLELIC MUTATION OF PALB2 GENE: ICD-10-CM

## 2024-07-24 DIAGNOSIS — Z90.13 STATUS POST BILATERAL MASTECTOMY: ICD-10-CM

## 2024-07-24 DIAGNOSIS — Z15.09 MONOALLELIC MUTATION OF PALB2 GENE: ICD-10-CM

## 2024-07-24 PROCEDURE — 2500000004 HC RX 250 GENERAL PHARMACY W/ HCPCS (ALT 636 FOR OP/ED): Mod: JZ | Performed by: NURSE PRACTITIONER

## 2024-07-24 PROCEDURE — 96402 CHEMO HORMON ANTINEOPL SQ/IM: CPT

## 2024-07-24 PROCEDURE — 96372 THER/PROPH/DIAG INJ SC/IM: CPT

## 2024-07-24 PROCEDURE — 2500000005 HC RX 250 GENERAL PHARMACY W/O HCPCS: Performed by: NURSE PRACTITIONER

## 2024-07-24 RX ORDER — EPINEPHRINE 0.3 MG/.3ML
0.3 INJECTION SUBCUTANEOUS EVERY 5 MIN PRN
OUTPATIENT
Start: 2024-08-14

## 2024-07-24 RX ORDER — EPINEPHRINE 0.3 MG/.3ML
0.3 INJECTION SUBCUTANEOUS EVERY 5 MIN PRN
Status: DISCONTINUED | OUTPATIENT
Start: 2024-07-24 | End: 2024-07-24 | Stop reason: HOSPADM

## 2024-07-24 RX ORDER — DIPHENHYDRAMINE HYDROCHLORIDE 50 MG/ML
50 INJECTION INTRAMUSCULAR; INTRAVENOUS AS NEEDED
OUTPATIENT
Start: 2024-08-14

## 2024-07-24 RX ORDER — ALBUTEROL SULFATE 0.83 MG/ML
3 SOLUTION RESPIRATORY (INHALATION) AS NEEDED
Status: DISCONTINUED | OUTPATIENT
Start: 2024-07-24 | End: 2024-07-24 | Stop reason: HOSPADM

## 2024-07-24 RX ORDER — FAMOTIDINE 10 MG/ML
20 INJECTION INTRAVENOUS ONCE AS NEEDED
OUTPATIENT
Start: 2024-08-14

## 2024-07-24 RX ORDER — LIDOCAINE HYDROCHLORIDE 10 MG/ML
2 INJECTION, SOLUTION EPIDURAL; INFILTRATION; INTRACAUDAL; PERINEURAL ONCE
OUTPATIENT
Start: 2024-08-14

## 2024-07-24 RX ORDER — LIDOCAINE HYDROCHLORIDE 10 MG/ML
2 INJECTION, SOLUTION EPIDURAL; INFILTRATION; INTRACAUDAL; PERINEURAL ONCE
Status: COMPLETED | OUTPATIENT
Start: 2024-07-24 | End: 2024-07-24

## 2024-07-24 RX ORDER — ALBUTEROL SULFATE 0.83 MG/ML
3 SOLUTION RESPIRATORY (INHALATION) AS NEEDED
OUTPATIENT
Start: 2024-08-14

## 2024-07-24 RX ORDER — DIPHENHYDRAMINE HYDROCHLORIDE 50 MG/ML
50 INJECTION INTRAMUSCULAR; INTRAVENOUS AS NEEDED
Status: DISCONTINUED | OUTPATIENT
Start: 2024-07-24 | End: 2024-07-24 | Stop reason: HOSPADM

## 2024-07-24 RX ORDER — FAMOTIDINE 10 MG/ML
20 INJECTION INTRAVENOUS ONCE AS NEEDED
Status: DISCONTINUED | OUTPATIENT
Start: 2024-07-24 | End: 2024-07-24 | Stop reason: HOSPADM

## 2024-07-24 ASSESSMENT — PAIN SCALES - GENERAL: PAINLEVEL: 0-NO PAIN

## 2024-08-21 ENCOUNTER — INFUSION (OUTPATIENT)
Dept: HEMATOLOGY/ONCOLOGY | Facility: CLINIC | Age: 34
End: 2024-08-21
Payer: COMMERCIAL

## 2024-08-21 ENCOUNTER — OFFICE VISIT (OUTPATIENT)
Dept: HEMATOLOGY/ONCOLOGY | Facility: CLINIC | Age: 34
End: 2024-08-21
Payer: COMMERCIAL

## 2024-08-21 VITALS
WEIGHT: 188.05 LBS | DIASTOLIC BLOOD PRESSURE: 63 MMHG | HEART RATE: 82 BPM | TEMPERATURE: 97.9 F | SYSTOLIC BLOOD PRESSURE: 86 MMHG | BODY MASS INDEX: 26.98 KG/M2

## 2024-08-21 DIAGNOSIS — C77.3 CARCINOMA OF RIGHT BREAST METASTATIC TO AXILLARY LYMPH NODE (MULTI): ICD-10-CM

## 2024-08-21 DIAGNOSIS — Z15.01 MONOALLELIC MUTATION OF PALB2 GENE: ICD-10-CM

## 2024-08-21 DIAGNOSIS — Z15.09 MONOALLELIC MUTATION OF PALB2 GENE: ICD-10-CM

## 2024-08-21 DIAGNOSIS — E28.39 SUPPRESSION OF OVARIAN SECRETION: ICD-10-CM

## 2024-08-21 DIAGNOSIS — Z90.13 STATUS POST BILATERAL MASTECTOMY: ICD-10-CM

## 2024-08-21 DIAGNOSIS — G62.9 NEUROPATHY: ICD-10-CM

## 2024-08-21 DIAGNOSIS — Z85.3 ENCOUNTER FOR FOLLOW-UP SURVEILLANCE OF BREAST CANCER: ICD-10-CM

## 2024-08-21 DIAGNOSIS — Z08 ENCOUNTER FOR FOLLOW-UP SURVEILLANCE OF BREAST CANCER: ICD-10-CM

## 2024-08-21 DIAGNOSIS — C50.919: ICD-10-CM

## 2024-08-21 DIAGNOSIS — C50.911 CARCINOMA OF RIGHT BREAST METASTATIC TO AXILLARY LYMPH NODE (MULTI): ICD-10-CM

## 2024-08-21 DIAGNOSIS — Z79.811 AROMATASE INHIBITOR USE: ICD-10-CM

## 2024-08-21 DIAGNOSIS — Z15.89 MONOALLELIC MUTATION OF PALB2 GENE: ICD-10-CM

## 2024-08-21 PROCEDURE — 2500000004 HC RX 250 GENERAL PHARMACY W/ HCPCS (ALT 636 FOR OP/ED): Mod: JZ | Performed by: NURSE PRACTITIONER

## 2024-08-21 PROCEDURE — 2500000005 HC RX 250 GENERAL PHARMACY W/O HCPCS: Performed by: NURSE PRACTITIONER

## 2024-08-21 PROCEDURE — 99214 OFFICE O/P EST MOD 30 MIN: CPT | Performed by: NURSE PRACTITIONER

## 2024-08-21 PROCEDURE — 96372 THER/PROPH/DIAG INJ SC/IM: CPT

## 2024-08-21 PROCEDURE — 1036F TOBACCO NON-USER: CPT | Performed by: NURSE PRACTITIONER

## 2024-08-21 PROCEDURE — 96402 CHEMO HORMON ANTINEOPL SQ/IM: CPT

## 2024-08-21 RX ORDER — EPINEPHRINE 0.3 MG/.3ML
0.3 INJECTION SUBCUTANEOUS EVERY 5 MIN PRN
OUTPATIENT
Start: 2024-09-04

## 2024-08-21 RX ORDER — FAMOTIDINE 10 MG/ML
20 INJECTION INTRAVENOUS ONCE AS NEEDED
OUTPATIENT
Start: 2024-09-04

## 2024-08-21 RX ORDER — LIDOCAINE HYDROCHLORIDE 10 MG/ML
2 INJECTION, SOLUTION EPIDURAL; INFILTRATION; INTRACAUDAL; PERINEURAL ONCE
OUTPATIENT
Start: 2024-09-04

## 2024-08-21 RX ORDER — DIPHENHYDRAMINE HYDROCHLORIDE 50 MG/ML
50 INJECTION INTRAMUSCULAR; INTRAVENOUS AS NEEDED
OUTPATIENT
Start: 2024-09-04

## 2024-08-21 RX ORDER — ALBUTEROL SULFATE 0.83 MG/ML
3 SOLUTION RESPIRATORY (INHALATION) AS NEEDED
OUTPATIENT
Start: 2024-09-04

## 2024-08-21 RX ORDER — LIDOCAINE HYDROCHLORIDE 10 MG/ML
2 INJECTION, SOLUTION EPIDURAL; INFILTRATION; INTRACAUDAL; PERINEURAL ONCE
Status: COMPLETED | OUTPATIENT
Start: 2024-08-21 | End: 2024-08-21

## 2024-08-21 NOTE — PROGRESS NOTES
Patient ID: Nathaly Powell is a 34 y.o. female.  BREAST CANCER DIAGNOSIS:    Breast         AJCC Edition: 8th (AJCC), Diagnosis Date: 2019, IIIA, cT2 cN1 cM0 G3        Treatment History:    1 Screening mammogram 3/24/19 showed suspicious right breast calcifications over 5 cm  2. Core biopsy right breast 3/28/19 showed DCIS and invasive ductal carcinoma grade 2-3 ER 95% GA 0% HER2 2+ not amplified. Core biopsy right axillary LN 4/15/19 positive for adenocarcinoma  3. Metastatic work up 19 negative bone scan and CT other than breast and LN findings right axilla  4. Preoperative chemotherapy with Adriamycin/Cytoxan x4 dose dense followed by weekly Paclitaxel x 12 initiated 5/10/19 and completed 19.   5. Genetic testing result showed PAL B2 Portuguese mutation and variant of unknown significance in CHEK2  6. She had bilateral mastectomy and right SLN 19. In the right breast was residual microinvasive carcinoma. LVI remained. 0/3 SLN involved. Left breast benign   7. Goserelin initiated 19. Anastrozole started 19 with Zometa  8. Radiation to R chest wall completed 2020.   9. On 10/14/20 - Second stage b/l breast reconstruction with BRIDGET flap muscle sparing. Due to this surgery, she received one dose of Trelstar IM instead of Zoladex in October. Then she will go back to Zoladex but transition to monthly   10. Completion of Zometa x7 doses Dec 2022       Past Medical History: Nathaly has a past medical history of Breast cancer (Multi) (), Encounter for supervision of other normal pregnancy, third trimester ([facility]) (2017), History of uterine scar from previous surgery (2019), Personal history of malignant neoplasm of breast (2020), and Presence of (intrauterine) contraceptive device.  Surgical History:  Nathaly has a past surgical history that includes Other surgical history (2020);  section, classic (2019); Other surgical history (2019); and CT  angio abdomen pelvis w and or wo IV IV contrast (6/22/2020).  Social History:  Nathaly reports that she has never smoked. She has never used smokeless tobacco. She reports current alcohol use. She reports that she does not use drugs.  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 4 yr old daughter    Family History:    Family History   Problem Relation Name Age of Onset    Breast cancer Mother      Prostate cancer Maternal Grandfather       Family Oncology History:  Cancer-related family history includes Breast cancer in her mother; Prostate cancer in her maternal grandfather.    HISTORY OF PRESENT ILLNESS:  Nathaly Powell is a 34 y.o. female who is here for Breast cancer treatment follow-up and survivorship. She has no breast cancer concerns today.  She is continued compliant on the anastrazole. She denies any periods or breakthrough bleeding. She reports on and off unchanged hot flashes. She is currently dealing with poison IVY.      She denies any skin lesions or masses, oral sores lesions or infections.      She denies any chest pain or breathing issues, no cough or short of breath. She reports a chest cold this summer that lasted a month and has resolved.       She denies any vision issues, headache issues, dizziness or loss of balance, falls. Denies any substantial issues with neuropathy     She denies any new or unexplained bone aches or pains     She reports a normal appetite. She has normal bowel movements and normal urination.      She intermittent issues with sleep. She denies any substantial fatigue     Vitamin D 2,000 International Unit(s) daily and calcium chews daily, Vitamin B6 for neuropathy. She also takes magnesium.     Review of Systems - Oncology  ROS 14 points performed, See HPI for exceptions    OBJECTIVE:    VS / Pain:  BP 86/63 (BP  Location: Right arm, Patient Position: Sitting, BP Cuff Size: Large adult)   Pulse 82   Temp 36.6 °C (97.9 °F) (Temporal)   Wt 85.3 kg (188 lb 0.8 oz)   BMI 26.98 kg/m²   BSA: 2.05 meters squared   Pain Scale: 0  ECO- Fully active, able to carry on all pre-disease performance w/o restriction.      Performance Status:       Physical Exam  Constitutional: Well developed, awake/alert/oriented x4, no distress, alert and cooperative  EYES: Sclera clear  ENMT: mucous membranes moist, no apparent injury, no lesions seen  Head/Neck: Neck supple, no apparent injury, thyroid without mass or tenderness, No JVD, trachea midline, no bruits  Respiratory / Thoracic: Patent airways, clear to all lobes, normal breath sounds with good chest expansion, thorax symmetric.  Cardiovascular: Regular, rate and rhythm, no murmurs, 2+ equal pulses of the extremities, normal auscultated S 1and S 2  GI: Nondistended, soft, non-tender, no rebound tenderness or guarding, no masses palpable, no organomegaly, +BS, no bruits  Musculoskeletal: ROM intact, no joint swelling, normal strength, no spinal tenderness  Extremities: normal extremities, no cyanosis edema, contusions or wounds, no clubbing  Neurological: alert and oriented x4, intact senses, motor, response and reflexes, normal strength  Breast: Bilateral mastectomy BRIDGET flap reconstruction well healed, no masses, lumps, nodules.   Lymphatic: No cervical, supraclavicular, infraclavicular or axillary lymphadenopathy  Psychological: Appropriate and talkative mood and behavior  Skin: Warm and dry, no lesions, no rashes, no jaundice          Diagnostic Results   US PELVIS TRANSABDOMINAL WITH TRANSVAGINAL  Narrative: Interpreted By:  Haley Forbes,   STUDY:  US PELVIS TRANSABDOMINAL WITH TRANSVAGINAL;  2024 8:53 am      INDICATION:  Signs/Symptoms:High risk for ovarian cancer.      COMPARISON:  Pelvic ultrasound dated 10/14/2022.      ACCESSION NUMBER(S):  [id number]      ORDERING  CLINICIAN:  TOMEKA LAYNE      TECHNIQUE:  Multiple multiplanar static gray scale, color and spectral waveform  sonographic images of the pelvis were obtained. Transabdominal and  endovaginal ultrasound was performed. This examination was  interpreted at [facility].      FINDINGS:  UTERUS:  The uterus measures  2.8 x 2.8 x 6.8. The uterine myometrium appears  normal. There is appropriately positioned IUD device within the  uterine body endometrial cavity.      ENDOMETRIUM:  The endometrium measures a thickness of 0.3 cm, which is normal.      RIGHT ADNEXA:  No gross right adnexal masses are seen, no hydrosalpinx. Right ovary  is not well visualized likely due to increased bowel gas.      LEFT ADNEXA:  No gross left adnexal masses are seen, no hydrosalpinx. Left ovary is  not well visualized likely due to increased bowel gas.      CUL DE SAC:  No gross free fluid is seen in the pelvic cul-de-sac.      Impression: 1. Bilateral ovaries are not visualized and hence not assessed. This  is likely due to presence of bowel gas in the pelvis. If further  evaluation is clinically warranted, a CT or MRI study may be obtained.  2. Unremarkable sonographic evaluation of the uterus.      MACRO:  None      Signed by: Haley Forbes 2/23/2024 1:03 PM  Dictation workstation:   [id number]     Xray Bone Density Dexa 1 or more sites, Axial Skeleton [Sep 1 2023 3:33PM]  FINAL REPORT   Interpreted by:  LAKSHMI GROVES HANAUER, MD   09/01/23 15:30   Facility: [facility]     MRN: [id number]   Patient Name: MARTÍNEZ AMEZCUA     STUDY:   BONE DENSITY, DEXA 1 OR MORE SITES: AXIAL SKELETN9/1/2023 1:30 pm     INDICATION:    Hx breast cancer C50.911: Breast cancer, right M89.9: Bone disorder   N95.9: Premenopausal patient E28.39: Suppression of ovarian   secretion. The patient is a 32 y/o year old F.     COMPARISON:   Previous exam is from 08/17/2021 at  which time bone mineral density   was  normal..     ACCESSION NUMBER(S):   [id number]     ORDERING CLINICIAN:   LILA GARDNER     TECHNIQUE:   BONE DENSITY, DEXA 1 OR MORE SITES: AXIAL SKELETN     FINDINGS:    SPINE L1-L4   Bone Mineral Density: 1.390   T-Score 1.7 Z-Score 1.0       LEFT FEMUR -TOTAL   Bone Mineral Density: 0.973   T-Score -0.3 Z-Score -0.6       LEFT FEMUR -NECK   Bone Mineral Density: 1.007   T-Score  -0.2 Z-Score -0.4         World Health Organization (WHO) criteria for post-menopausal,    Women:   Normal: T-score at or above -1 SD   Osteopenia: T-score between -1 and -2.5 SD   Osteoporosis: T-score at or below  -2.5 SD       10-year Fracture Risk:   Major Osteoporotic Fracture Not reported   Hip Fracture Not reported     Note: If no FRAX score is reported, it is because:   Some T-score for Spine Total or Hip Total or Femoral Neck  at or below   -2.5     This exam was performed at [facility] at [facility] on a GE Lunar Prodigy Advance Dexa Unit.     IMPRESSION:   DEXA: According to World Health Organization criteria,   classification  is   normal.     Followup recommended in 2 years or sooner as clinically warranted.     All images and detailed analysis are available on the  Radiology   PACS.     Transcribed By: Interface, User   Electronically Signed  By: LAKSHMI GROVES HANAUER 09/01/23 15:30     Lab Results   Component Value Date    WBC 5.0 04/24/2024    HGB 12.6 04/24/2024    HCT 37.5 04/24/2024    MCV 87 04/24/2024     04/24/2024          Chemistry    Lab Results   Component Value Date/Time     04/24/2024 1538    K 4.2 04/24/2024 1538     04/24/2024 1538    CO2 29 04/24/2024 1538    BUN 15 04/24/2024 1538    CREATININE 0.96 04/24/2024 1538    Lab Results   Component Value Date/Time    CALCIUM 10.1 04/24/2024 1538    ALKPHOS 68 04/24/2024 1538    AST 25 04/24/2024 1538    ALT 21 04/24/2024 1538    BILITOT 0.5 04/24/2024 1538                 Assessment/Plan   Carcinoma of right  breast metastatic to axillary lymph node (Multi), Clinical: cT2, cN1, cM0, G3  Diagnoses and all orders for this visit:  Suppression of ovarian secretion  -     Clinic Appointment Request Follow up; LILA GARDNER  -     Anti-Mullerian Hormone (AMH); Future  -     Comprehensive Metabolic Panel; Future  -     CBC and Auto Differential; Future  -     Estradiol; Future  -     Luteinizing Hormone; Future  -     Follicle Stimulating Hormone; Future  Carcinoma of right breast metastatic to axillary lymph node (Multi)  -     Clinic Appointment Request Follow up; LILA GARDNER  -     Clinic Appointment Request Follow up; LILA GARDNER; Future  -     Anti-Mullerian Hormone (AMH); Future  -     Comprehensive Metabolic Panel; Future  -     CBC and Auto Differential; Future  -     Estradiol; Future  -     Luteinizing Hormone; Future  -     Follicle Stimulating Hormone; Future  PALB2-related breast cancer in female (Multi)  Status post bilateral mastectomy  -     Clinic Appointment Request Follow up; LILA GARDNER  Aromatase inhibitor use  -     Clinic Appointment Request Follow up; LILA GARDNER  Encounter for follow-up surveillance of breast cancer  -     Clinic Appointment Request Follow up; LILA GARDNER  Neuropathy  Monoallelic mutation of PALB2 gene  -     Clinic Appointment Request Follow up; LILA GARDNER      Problem List Items Addressed This Visit       Carcinoma of right breast metastatic to axillary lymph node (Multi)    Relevant Orders    Clinic Appointment Request Follow up; LILA GARDNER    Anti-Mullerian Hormone (AMH)    Comprehensive Metabolic Panel    CBC and Auto Differential    Estradiol    Luteinizing Hormone    Follicle Stimulating Hormone    PALB2-related breast cancer in female (Multi)    Status post bilateral mastectomy    Neuropathy    Monoallelic mutation of PALB2 gene    Aromatase inhibitor use    Suppression of ovarian secretion    Relevant Orders     Anti-Mullerian Hormone (AMH)    Comprehensive Metabolic Panel    CBC and Auto Differential    Estradiol    Luteinizing Hormone    Follicle Stimulating Hormone    Encounter for follow-up surveillance of breast cancer       T2N1M0 HR + Invasive Ductal Carcinoma Right, Stage IIIA  s/p Neoadjuvant chemo with AC-T in 9/2019, Bilateral mastectomy with right SLN performed 11/1/19. She had microscopic residual disease, LVI and 0/3 SLN. There was no residual disease in the node with  biopsy site changes. Ovarian suppression started 11/19/19. Anastrozole commenced 12/2019 and Zometa every 6 months for 3 years commenced 12/17/19. Radiation therapy 1/2020. She had a Genetics consult with testing showing a mutation in the PALB2 gene,  and a variant of unknown significance in the CHEK2 gene; because of this she has annual surveillance and pelvic ultrasound with Dr. Haskins.      -She is continues to tolerate Anastrozole. Completion of adjuvant Zometa Dec 2022, received 7 doses    -Will complete 5 year course of ovarian suppression in Dec 2024. My preference is transition to Tamoxifen for additional 5 years. She is agreeable to start tamoxifen in Jan.      There is no evidence of breast cancer recurrence based on her clinical exam today.      Bone Health  Updated DEXA showing normal density Sept 2023. Continued encouragement of weight bearing exercises, ca and vitamin D     General Health Maintenance   PCP Dr Meza annually and as needed  Is established with GYN     At least 25 minutes of direct consultation was spent with the patient today reviewing her cancer care plan, educating and answering questions regarding ongoing follow up, greater than 50% in counseling and coordination of care.      Treatment Plan:  Venous Access Orders  Goserelin, Every 28 Days          Thank you for the opportunity to be involved in the care of Nathaly Powell.   We discussed the clinical significance of diagnosis, goals of care and treatment plan in  detail.   Please do not hesitate to reach out with any questions. Thank you.     -------------------------------------------------------------------------------------------------------------------------------  Emerita Griffin MSN, APRN, FNP-C  [facility]  Division of Medical Oncology- Breast   Collaborating Physician Dr. Tunde Walker   Team Nurse Partners Eli Rivera, Linda Trinidad [place] [address]  Phone: [phone]  Fax: [phone]  Available via Secure Chat    Confidential Peer Review Document-  Privilege  Privileged Pursuant to [place] Revised Code Section 2305.24, .25, .251 & .252

## 2024-08-21 NOTE — PATIENT INSTRUCTIONS
Continue Anastrozole daily. Will remain on this for 5 years along with Zoladex (Dec 2024) then will likely transition to Tamoxifen for 5 years- this is my preference. I have placed an alert in my calendar to send in the tamoxifen in late Dec 2024. I would like you to stop Anastrozole on 1/11/25 and then start tamoxifen 2 weeks later      Zoladex monthly today, 9/18, 10/16, 11/13, 12/11 (this will be your final dose)     Emerita HOLM, CNP 6 month follow-up March /April 2025     Follow up with Radha Dickens CNP December 13 2024     DEXA completed Sept 2023 with normal results, planned repeat late 2025     GYN Dr. Haskins annually for pelvic ultrasound and follow-up      PCP Dr Morrissey annually and as needed      Call with any questions, concerns or treatment intolerance prior to next office visit [phone].

## 2024-09-11 ENCOUNTER — APPOINTMENT (OUTPATIENT)
Dept: HEMATOLOGY/ONCOLOGY | Facility: CLINIC | Age: 34
End: 2024-09-11
Payer: COMMERCIAL

## 2024-09-18 ENCOUNTER — INFUSION (OUTPATIENT)
Dept: HEMATOLOGY/ONCOLOGY | Facility: CLINIC | Age: 34
End: 2024-09-18
Payer: COMMERCIAL

## 2024-09-18 VITALS
HEART RATE: 65 BPM | SYSTOLIC BLOOD PRESSURE: 100 MMHG | RESPIRATION RATE: 16 BRPM | TEMPERATURE: 97.3 F | DIASTOLIC BLOOD PRESSURE: 65 MMHG | BODY MASS INDEX: 27.36 KG/M2 | WEIGHT: 190.7 LBS

## 2024-09-18 DIAGNOSIS — Z08 ENCOUNTER FOR FOLLOW-UP SURVEILLANCE OF BREAST CANCER: ICD-10-CM

## 2024-09-18 DIAGNOSIS — Z15.01 MONOALLELIC MUTATION OF PALB2 GENE: ICD-10-CM

## 2024-09-18 DIAGNOSIS — Z15.09 MONOALLELIC MUTATION OF PALB2 GENE: ICD-10-CM

## 2024-09-18 DIAGNOSIS — Z15.89 MONOALLELIC MUTATION OF PALB2 GENE: ICD-10-CM

## 2024-09-18 DIAGNOSIS — Z90.13 STATUS POST BILATERAL MASTECTOMY: ICD-10-CM

## 2024-09-18 DIAGNOSIS — E28.39 SUPPRESSION OF OVARIAN SECRETION: ICD-10-CM

## 2024-09-18 DIAGNOSIS — C50.911 CARCINOMA OF RIGHT BREAST METASTATIC TO AXILLARY LYMPH NODE (MULTI): ICD-10-CM

## 2024-09-18 DIAGNOSIS — C77.3 CARCINOMA OF RIGHT BREAST METASTATIC TO AXILLARY LYMPH NODE (MULTI): ICD-10-CM

## 2024-09-18 DIAGNOSIS — Z79.811 AROMATASE INHIBITOR USE: ICD-10-CM

## 2024-09-18 DIAGNOSIS — Z85.3 ENCOUNTER FOR FOLLOW-UP SURVEILLANCE OF BREAST CANCER: ICD-10-CM

## 2024-09-18 PROCEDURE — 96402 CHEMO HORMON ANTINEOPL SQ/IM: CPT

## 2024-09-18 PROCEDURE — 2500000005 HC RX 250 GENERAL PHARMACY W/O HCPCS: Performed by: NURSE PRACTITIONER

## 2024-09-18 PROCEDURE — 96372 THER/PROPH/DIAG INJ SC/IM: CPT

## 2024-09-18 PROCEDURE — 2500000004 HC RX 250 GENERAL PHARMACY W/ HCPCS (ALT 636 FOR OP/ED): Mod: JZ | Performed by: NURSE PRACTITIONER

## 2024-09-18 RX ORDER — FAMOTIDINE 10 MG/ML
20 INJECTION INTRAVENOUS ONCE AS NEEDED
Status: DISCONTINUED | OUTPATIENT
Start: 2024-09-18 | End: 2024-09-18 | Stop reason: HOSPADM

## 2024-09-18 RX ORDER — ALBUTEROL SULFATE 0.83 MG/ML
3 SOLUTION RESPIRATORY (INHALATION) AS NEEDED
OUTPATIENT
Start: 2024-10-09

## 2024-09-18 RX ORDER — EPINEPHRINE 0.3 MG/.3ML
0.3 INJECTION SUBCUTANEOUS EVERY 5 MIN PRN
OUTPATIENT
Start: 2024-10-09

## 2024-09-18 RX ORDER — DIPHENHYDRAMINE HYDROCHLORIDE 50 MG/ML
50 INJECTION INTRAMUSCULAR; INTRAVENOUS AS NEEDED
Status: DISCONTINUED | OUTPATIENT
Start: 2024-09-18 | End: 2024-09-18 | Stop reason: HOSPADM

## 2024-09-18 RX ORDER — EPINEPHRINE 0.3 MG/.3ML
0.3 INJECTION SUBCUTANEOUS EVERY 5 MIN PRN
Status: DISCONTINUED | OUTPATIENT
Start: 2024-09-18 | End: 2024-09-18 | Stop reason: HOSPADM

## 2024-09-18 RX ORDER — LIDOCAINE HYDROCHLORIDE 10 MG/ML
2 INJECTION, SOLUTION EPIDURAL; INFILTRATION; INTRACAUDAL; PERINEURAL ONCE
OUTPATIENT
Start: 2024-10-09

## 2024-09-18 RX ORDER — LIDOCAINE HYDROCHLORIDE 10 MG/ML
2 INJECTION, SOLUTION EPIDURAL; INFILTRATION; INTRACAUDAL; PERINEURAL ONCE
Status: COMPLETED | OUTPATIENT
Start: 2024-09-18 | End: 2024-09-18

## 2024-09-18 RX ORDER — ALBUTEROL SULFATE 0.83 MG/ML
3 SOLUTION RESPIRATORY (INHALATION) AS NEEDED
Status: DISCONTINUED | OUTPATIENT
Start: 2024-09-18 | End: 2024-09-18 | Stop reason: HOSPADM

## 2024-09-18 RX ORDER — DIPHENHYDRAMINE HYDROCHLORIDE 50 MG/ML
50 INJECTION INTRAMUSCULAR; INTRAVENOUS AS NEEDED
OUTPATIENT
Start: 2024-10-09

## 2024-09-18 RX ORDER — FAMOTIDINE 10 MG/ML
20 INJECTION INTRAVENOUS ONCE AS NEEDED
OUTPATIENT
Start: 2024-10-09

## 2024-09-18 ASSESSMENT — PAIN SCALES - GENERAL: PAINLEVEL: 0-NO PAIN

## 2024-09-25 ENCOUNTER — TELEPHONE (OUTPATIENT)
Dept: PRIMARY CARE | Facility: CLINIC | Age: 34
End: 2024-09-25
Payer: COMMERCIAL

## 2024-10-09 ENCOUNTER — APPOINTMENT (OUTPATIENT)
Dept: HEMATOLOGY/ONCOLOGY | Facility: CLINIC | Age: 34
End: 2024-10-09
Payer: COMMERCIAL

## 2024-10-16 ENCOUNTER — INFUSION (OUTPATIENT)
Dept: HEMATOLOGY/ONCOLOGY | Facility: CLINIC | Age: 34
End: 2024-10-16
Payer: COMMERCIAL

## 2024-10-16 VITALS
HEART RATE: 63 BPM | BODY MASS INDEX: 27.23 KG/M2 | DIASTOLIC BLOOD PRESSURE: 71 MMHG | RESPIRATION RATE: 17 BRPM | WEIGHT: 189.8 LBS | SYSTOLIC BLOOD PRESSURE: 115 MMHG | OXYGEN SATURATION: 100 % | TEMPERATURE: 97 F

## 2024-10-16 DIAGNOSIS — Z08 ENCOUNTER FOR FOLLOW-UP SURVEILLANCE OF BREAST CANCER: ICD-10-CM

## 2024-10-16 DIAGNOSIS — Z90.13 STATUS POST BILATERAL MASTECTOMY: ICD-10-CM

## 2024-10-16 DIAGNOSIS — C77.3 CARCINOMA OF RIGHT BREAST METASTATIC TO AXILLARY LYMPH NODE (MULTI): Primary | ICD-10-CM

## 2024-10-16 DIAGNOSIS — C50.911 CARCINOMA OF RIGHT BREAST METASTATIC TO AXILLARY LYMPH NODE (MULTI): Primary | ICD-10-CM

## 2024-10-16 DIAGNOSIS — C77.3 CARCINOMA OF RIGHT BREAST METASTATIC TO AXILLARY LYMPH NODE (MULTI): ICD-10-CM

## 2024-10-16 DIAGNOSIS — Z15.09 MONOALLELIC MUTATION OF PALB2 GENE: ICD-10-CM

## 2024-10-16 DIAGNOSIS — E28.39 SUPPRESSION OF OVARIAN SECRETION: ICD-10-CM

## 2024-10-16 DIAGNOSIS — Z79.811 AROMATASE INHIBITOR USE: ICD-10-CM

## 2024-10-16 DIAGNOSIS — C50.911 CARCINOMA OF RIGHT BREAST METASTATIC TO AXILLARY LYMPH NODE (MULTI): ICD-10-CM

## 2024-10-16 DIAGNOSIS — Z85.3 ENCOUNTER FOR FOLLOW-UP SURVEILLANCE OF BREAST CANCER: ICD-10-CM

## 2024-10-16 DIAGNOSIS — Z15.01 MONOALLELIC MUTATION OF PALB2 GENE: ICD-10-CM

## 2024-10-16 DIAGNOSIS — Z15.89 MONOALLELIC MUTATION OF PALB2 GENE: ICD-10-CM

## 2024-10-16 PROCEDURE — 96372 THER/PROPH/DIAG INJ SC/IM: CPT

## 2024-10-16 PROCEDURE — 96402 CHEMO HORMON ANTINEOPL SQ/IM: CPT

## 2024-10-16 PROCEDURE — 2500000004 HC RX 250 GENERAL PHARMACY W/ HCPCS (ALT 636 FOR OP/ED): Performed by: NURSE PRACTITIONER

## 2024-10-16 RX ORDER — LIDOCAINE HYDROCHLORIDE 10 MG/ML
2 INJECTION, SOLUTION EPIDURAL; INFILTRATION; INTRACAUDAL; PERINEURAL ONCE
Status: CANCELLED | OUTPATIENT
Start: 2024-10-16

## 2024-10-16 RX ORDER — LIDOCAINE HYDROCHLORIDE 10 MG/ML
2 INJECTION, SOLUTION EPIDURAL; INFILTRATION; INTRACAUDAL; PERINEURAL ONCE
Status: COMPLETED | OUTPATIENT
Start: 2024-10-16 | End: 2024-10-16

## 2024-10-16 RX ORDER — DIPHENHYDRAMINE HYDROCHLORIDE 50 MG/ML
50 INJECTION INTRAMUSCULAR; INTRAVENOUS AS NEEDED
Status: CANCELLED | OUTPATIENT
Start: 2024-10-16

## 2024-10-16 RX ORDER — FAMOTIDINE 10 MG/ML
20 INJECTION INTRAVENOUS ONCE AS NEEDED
OUTPATIENT
Start: 2024-10-21

## 2024-10-16 RX ORDER — EPINEPHRINE 0.3 MG/.3ML
0.3 INJECTION SUBCUTANEOUS EVERY 5 MIN PRN
Status: DISCONTINUED | OUTPATIENT
Start: 2024-10-16 | End: 2024-10-16 | Stop reason: HOSPADM

## 2024-10-16 RX ORDER — DIPHENHYDRAMINE HYDROCHLORIDE 50 MG/ML
50 INJECTION INTRAMUSCULAR; INTRAVENOUS AS NEEDED
OUTPATIENT
Start: 2024-10-21

## 2024-10-16 RX ORDER — EPINEPHRINE 0.3 MG/.3ML
0.3 INJECTION SUBCUTANEOUS EVERY 5 MIN PRN
Status: CANCELLED | OUTPATIENT
Start: 2024-10-16

## 2024-10-16 RX ORDER — ALBUTEROL SULFATE 0.83 MG/ML
3 SOLUTION RESPIRATORY (INHALATION) AS NEEDED
Status: CANCELLED | OUTPATIENT
Start: 2024-10-16

## 2024-10-16 RX ORDER — LIDOCAINE HYDROCHLORIDE 10 MG/ML
2 INJECTION, SOLUTION EPIDURAL; INFILTRATION; INTRACAUDAL; PERINEURAL ONCE
OUTPATIENT
Start: 2024-10-21

## 2024-10-16 RX ORDER — FAMOTIDINE 10 MG/ML
20 INJECTION INTRAVENOUS ONCE AS NEEDED
Status: CANCELLED | OUTPATIENT
Start: 2024-10-16

## 2024-10-16 RX ORDER — DIPHENHYDRAMINE HYDROCHLORIDE 50 MG/ML
50 INJECTION INTRAMUSCULAR; INTRAVENOUS AS NEEDED
Status: DISCONTINUED | OUTPATIENT
Start: 2024-10-16 | End: 2024-10-16 | Stop reason: HOSPADM

## 2024-10-16 RX ORDER — EPINEPHRINE 0.3 MG/.3ML
0.3 INJECTION SUBCUTANEOUS EVERY 5 MIN PRN
OUTPATIENT
Start: 2024-10-21

## 2024-10-16 RX ORDER — ALBUTEROL SULFATE 0.83 MG/ML
3 SOLUTION RESPIRATORY (INHALATION) AS NEEDED
Status: DISCONTINUED | OUTPATIENT
Start: 2024-10-16 | End: 2024-10-16 | Stop reason: HOSPADM

## 2024-10-16 RX ORDER — FAMOTIDINE 10 MG/ML
20 INJECTION INTRAVENOUS ONCE AS NEEDED
Status: DISCONTINUED | OUTPATIENT
Start: 2024-10-16 | End: 2024-10-16 | Stop reason: HOSPADM

## 2024-10-16 RX ORDER — ALBUTEROL SULFATE 0.83 MG/ML
3 SOLUTION RESPIRATORY (INHALATION) AS NEEDED
OUTPATIENT
Start: 2024-10-21

## 2024-10-16 ASSESSMENT — PAIN SCALES - GENERAL: PAINLEVEL_OUTOF10: 0-NO PAIN

## 2024-10-30 ENCOUNTER — APPOINTMENT (OUTPATIENT)
Dept: PRIMARY CARE | Facility: CLINIC | Age: 34
End: 2024-10-30
Payer: COMMERCIAL

## 2024-10-30 VITALS
BODY MASS INDEX: 26.77 KG/M2 | HEART RATE: 62 BPM | DIASTOLIC BLOOD PRESSURE: 64 MMHG | WEIGHT: 187 LBS | SYSTOLIC BLOOD PRESSURE: 104 MMHG | HEIGHT: 70 IN

## 2024-10-30 DIAGNOSIS — Z00.00 WELL ADULT EXAM: Primary | ICD-10-CM

## 2024-10-30 PROBLEM — R50.9 FEVER: Status: ACTIVE | Noted: 2024-10-30

## 2024-10-30 PROBLEM — M89.9 DISORDER OF BONE: Status: ACTIVE | Noted: 2024-10-30

## 2024-10-30 PROBLEM — J20.8 ACUTE VIRAL BRONCHITIS: Status: ACTIVE | Noted: 2024-10-30

## 2024-10-30 PROBLEM — R92.8 ABNORMAL MAMMOGRAM: Status: ACTIVE | Noted: 2024-10-30

## 2024-10-30 PROCEDURE — 3008F BODY MASS INDEX DOCD: CPT | Performed by: FAMILY MEDICINE

## 2024-10-30 PROCEDURE — 99395 PREV VISIT EST AGE 18-39: CPT | Performed by: FAMILY MEDICINE

## 2024-10-30 ASSESSMENT — ENCOUNTER SYMPTOMS
DEPRESSION: 0
OCCASIONAL FEELINGS OF UNSTEADINESS: 0
LOSS OF SENSATION IN FEET: 0

## 2024-11-13 ENCOUNTER — INFUSION (OUTPATIENT)
Dept: HEMATOLOGY/ONCOLOGY | Facility: CLINIC | Age: 34
End: 2024-11-13
Payer: COMMERCIAL

## 2024-11-13 ENCOUNTER — LAB (OUTPATIENT)
Dept: LAB | Facility: CLINIC | Age: 34
End: 2024-11-13
Payer: COMMERCIAL

## 2024-11-13 VITALS
SYSTOLIC BLOOD PRESSURE: 103 MMHG | OXYGEN SATURATION: 97 % | WEIGHT: 187.17 LBS | BODY MASS INDEX: 26.86 KG/M2 | HEART RATE: 71 BPM | RESPIRATION RATE: 18 BRPM | TEMPERATURE: 99.1 F | DIASTOLIC BLOOD PRESSURE: 70 MMHG

## 2024-11-13 DIAGNOSIS — C50.911 CARCINOMA OF RIGHT BREAST METASTATIC TO AXILLARY LYMPH NODE (MULTI): ICD-10-CM

## 2024-11-13 DIAGNOSIS — Z15.89 MONOALLELIC MUTATION OF PALB2 GENE: ICD-10-CM

## 2024-11-13 DIAGNOSIS — Z90.13 STATUS POST BILATERAL MASTECTOMY: ICD-10-CM

## 2024-11-13 DIAGNOSIS — Z15.09 MONOALLELIC MUTATION OF PALB2 GENE: ICD-10-CM

## 2024-11-13 DIAGNOSIS — C77.3 CARCINOMA OF RIGHT BREAST METASTATIC TO AXILLARY LYMPH NODE (MULTI): ICD-10-CM

## 2024-11-13 DIAGNOSIS — Z85.3 ENCOUNTER FOR FOLLOW-UP SURVEILLANCE OF BREAST CANCER: ICD-10-CM

## 2024-11-13 DIAGNOSIS — Z79.811 AROMATASE INHIBITOR USE: ICD-10-CM

## 2024-11-13 DIAGNOSIS — E28.39 SUPPRESSION OF OVARIAN SECRETION: ICD-10-CM

## 2024-11-13 DIAGNOSIS — Z08 ENCOUNTER FOR FOLLOW-UP SURVEILLANCE OF BREAST CANCER: ICD-10-CM

## 2024-11-13 DIAGNOSIS — Z15.01 MONOALLELIC MUTATION OF PALB2 GENE: ICD-10-CM

## 2024-11-13 LAB
ALBUMIN SERPL BCP-MCNC: 4.3 G/DL (ref 3.4–5)
ALP SERPL-CCNC: 66 U/L (ref 33–110)
ALT SERPL W P-5'-P-CCNC: 19 U/L (ref 7–45)
ANION GAP SERPL CALC-SCNC: 13 MMOL/L (ref 10–20)
AST SERPL W P-5'-P-CCNC: 22 U/L (ref 9–39)
BASOPHILS # BLD AUTO: 0.02 X10*3/UL (ref 0–0.1)
BASOPHILS NFR BLD AUTO: 0.4 %
BILIRUB SERPL-MCNC: 0.5 MG/DL (ref 0–1.2)
BUN SERPL-MCNC: 12 MG/DL (ref 6–23)
CALCIUM SERPL-MCNC: 9.5 MG/DL (ref 8.6–10.6)
CHLORIDE SERPL-SCNC: 102 MMOL/L (ref 98–107)
CO2 SERPL-SCNC: 28 MMOL/L (ref 21–32)
CREAT SERPL-MCNC: 0.91 MG/DL (ref 0.5–1.05)
EGFRCR SERPLBLD CKD-EPI 2021: 84 ML/MIN/1.73M*2
EOSINOPHIL # BLD AUTO: 0.1 X10*3/UL (ref 0–0.7)
EOSINOPHIL NFR BLD AUTO: 2.1 %
ERYTHROCYTE [DISTWIDTH] IN BLOOD BY AUTOMATED COUNT: 12 % (ref 11.5–14.5)
ESTRADIOL SERPL-MCNC: <19 PG/ML
FSH SERPL-ACNC: 6 IU/L
GLUCOSE SERPL-MCNC: 89 MG/DL (ref 74–99)
HCT VFR BLD AUTO: 35.5 % (ref 36–46)
HGB BLD-MCNC: 12.2 G/DL (ref 12–16)
IMM GRANULOCYTES # BLD AUTO: 0 X10*3/UL (ref 0–0.7)
IMM GRANULOCYTES NFR BLD AUTO: 0 % (ref 0–0.9)
LH SERPL-ACNC: 0.2 IU/L
LYMPHOCYTES # BLD AUTO: 1.79 X10*3/UL (ref 1.2–4.8)
LYMPHOCYTES NFR BLD AUTO: 37.1 %
MCH RBC QN AUTO: 29.7 PG (ref 26–34)
MCHC RBC AUTO-ENTMCNC: 34.4 G/DL (ref 32–36)
MCV RBC AUTO: 86 FL (ref 80–100)
MONOCYTES # BLD AUTO: 0.34 X10*3/UL (ref 0.1–1)
MONOCYTES NFR BLD AUTO: 7.1 %
NEUTROPHILS # BLD AUTO: 2.57 X10*3/UL (ref 1.2–7.7)
NEUTROPHILS NFR BLD AUTO: 53.3 %
NRBC BLD-RTO: ABNORMAL /100{WBCS}
PLATELET # BLD AUTO: 169 X10*3/UL (ref 150–450)
POTASSIUM SERPL-SCNC: 4.3 MMOL/L (ref 3.5–5.3)
PROT SERPL-MCNC: 7.1 G/DL (ref 6.4–8.2)
RBC # BLD AUTO: 4.11 X10*6/UL (ref 4–5.2)
SODIUM SERPL-SCNC: 139 MMOL/L (ref 136–145)
WBC # BLD AUTO: 4.8 X10*3/UL (ref 4.4–11.3)

## 2024-11-13 PROCEDURE — 96372 THER/PROPH/DIAG INJ SC/IM: CPT

## 2024-11-13 PROCEDURE — 2500000004 HC RX 250 GENERAL PHARMACY W/ HCPCS (ALT 636 FOR OP/ED): Mod: JZ | Performed by: NURSE PRACTITIONER

## 2024-11-13 PROCEDURE — 80053 COMPREHEN METABOLIC PANEL: CPT

## 2024-11-13 PROCEDURE — 36415 COLL VENOUS BLD VENIPUNCTURE: CPT

## 2024-11-13 PROCEDURE — 83001 ASSAY OF GONADOTROPIN (FSH): CPT

## 2024-11-13 PROCEDURE — 96402 CHEMO HORMON ANTINEOPL SQ/IM: CPT

## 2024-11-13 PROCEDURE — 82670 ASSAY OF TOTAL ESTRADIOL: CPT

## 2024-11-13 PROCEDURE — 83002 ASSAY OF GONADOTROPIN (LH): CPT

## 2024-11-13 PROCEDURE — 85025 COMPLETE CBC W/AUTO DIFF WBC: CPT

## 2024-11-13 RX ORDER — ALBUTEROL SULFATE 0.83 MG/ML
3 SOLUTION RESPIRATORY (INHALATION) AS NEEDED
OUTPATIENT
Start: 2024-12-11

## 2024-11-13 RX ORDER — FAMOTIDINE 10 MG/ML
20 INJECTION INTRAVENOUS ONCE AS NEEDED
OUTPATIENT
Start: 2024-12-11

## 2024-11-13 RX ORDER — DIPHENHYDRAMINE HYDROCHLORIDE 50 MG/ML
50 INJECTION INTRAMUSCULAR; INTRAVENOUS AS NEEDED
OUTPATIENT
Start: 2024-12-11

## 2024-11-13 RX ORDER — LIDOCAINE HYDROCHLORIDE 10 MG/ML
2 INJECTION, SOLUTION EPIDURAL; INFILTRATION; INTRACAUDAL; PERINEURAL ONCE
Status: COMPLETED | OUTPATIENT
Start: 2024-11-13 | End: 2024-11-13

## 2024-11-13 RX ORDER — EPINEPHRINE 0.3 MG/.3ML
0.3 INJECTION SUBCUTANEOUS EVERY 5 MIN PRN
OUTPATIENT
Start: 2024-12-11

## 2024-11-13 RX ORDER — DIPHENHYDRAMINE HYDROCHLORIDE 50 MG/ML
50 INJECTION INTRAMUSCULAR; INTRAVENOUS AS NEEDED
Status: DISCONTINUED | OUTPATIENT
Start: 2024-11-13 | End: 2024-11-13 | Stop reason: HOSPADM

## 2024-11-13 RX ORDER — LIDOCAINE HYDROCHLORIDE 10 MG/ML
2 INJECTION, SOLUTION EPIDURAL; INFILTRATION; INTRACAUDAL; PERINEURAL ONCE
OUTPATIENT
Start: 2024-12-11

## 2024-11-13 RX ORDER — FAMOTIDINE 10 MG/ML
20 INJECTION INTRAVENOUS ONCE AS NEEDED
Status: DISCONTINUED | OUTPATIENT
Start: 2024-11-13 | End: 2024-11-13 | Stop reason: HOSPADM

## 2024-11-13 RX ORDER — EPINEPHRINE 0.3 MG/.3ML
0.3 INJECTION SUBCUTANEOUS EVERY 5 MIN PRN
Status: DISCONTINUED | OUTPATIENT
Start: 2024-11-13 | End: 2024-11-13 | Stop reason: HOSPADM

## 2024-11-13 RX ORDER — ALBUTEROL SULFATE 0.83 MG/ML
3 SOLUTION RESPIRATORY (INHALATION) AS NEEDED
Status: DISCONTINUED | OUTPATIENT
Start: 2024-11-13 | End: 2024-11-13 | Stop reason: HOSPADM

## 2024-11-13 ASSESSMENT — PAIN SCALES - GENERAL: PAINLEVEL_OUTOF10: 0-NO PAIN

## 2024-12-11 ENCOUNTER — INFUSION (OUTPATIENT)
Dept: HEMATOLOGY/ONCOLOGY | Facility: CLINIC | Age: 34
End: 2024-12-11
Payer: COMMERCIAL

## 2024-12-11 VITALS
OXYGEN SATURATION: 99 % | RESPIRATION RATE: 18 BRPM | BODY MASS INDEX: 27.85 KG/M2 | WEIGHT: 194.12 LBS | TEMPERATURE: 96.8 F | DIASTOLIC BLOOD PRESSURE: 71 MMHG | SYSTOLIC BLOOD PRESSURE: 106 MMHG | HEART RATE: 70 BPM

## 2024-12-11 DIAGNOSIS — C77.3 CARCINOMA OF RIGHT BREAST METASTATIC TO AXILLARY LYMPH NODE (MULTI): ICD-10-CM

## 2024-12-11 DIAGNOSIS — C50.911 CARCINOMA OF RIGHT BREAST METASTATIC TO AXILLARY LYMPH NODE (MULTI): ICD-10-CM

## 2024-12-11 DIAGNOSIS — Z90.13 STATUS POST BILATERAL MASTECTOMY: ICD-10-CM

## 2024-12-11 DIAGNOSIS — Z15.09 MONOALLELIC MUTATION OF PALB2 GENE: ICD-10-CM

## 2024-12-11 DIAGNOSIS — Z08 ENCOUNTER FOR FOLLOW-UP SURVEILLANCE OF BREAST CANCER: ICD-10-CM

## 2024-12-11 DIAGNOSIS — E28.39 SUPPRESSION OF OVARIAN SECRETION: ICD-10-CM

## 2024-12-11 DIAGNOSIS — Z15.89 MONOALLELIC MUTATION OF PALB2 GENE: ICD-10-CM

## 2024-12-11 DIAGNOSIS — Z85.3 ENCOUNTER FOR FOLLOW-UP SURVEILLANCE OF BREAST CANCER: ICD-10-CM

## 2024-12-11 DIAGNOSIS — Z79.811 AROMATASE INHIBITOR USE: ICD-10-CM

## 2024-12-11 DIAGNOSIS — Z15.01 MONOALLELIC MUTATION OF PALB2 GENE: ICD-10-CM

## 2024-12-11 PROCEDURE — 96372 THER/PROPH/DIAG INJ SC/IM: CPT

## 2024-12-11 PROCEDURE — 96402 CHEMO HORMON ANTINEOPL SQ/IM: CPT

## 2024-12-11 PROCEDURE — 2500000004 HC RX 250 GENERAL PHARMACY W/ HCPCS (ALT 636 FOR OP/ED): Performed by: NURSE PRACTITIONER

## 2024-12-11 RX ORDER — DIPHENHYDRAMINE HYDROCHLORIDE 50 MG/ML
50 INJECTION INTRAMUSCULAR; INTRAVENOUS AS NEEDED
Status: DISCONTINUED | OUTPATIENT
Start: 2024-12-11 | End: 2024-12-11 | Stop reason: HOSPADM

## 2024-12-11 RX ORDER — FAMOTIDINE 10 MG/ML
20 INJECTION INTRAVENOUS ONCE AS NEEDED
Status: CANCELLED | OUTPATIENT
Start: 2025-01-08

## 2024-12-11 RX ORDER — ALBUTEROL SULFATE 0.83 MG/ML
3 SOLUTION RESPIRATORY (INHALATION) AS NEEDED
Status: CANCELLED | OUTPATIENT
Start: 2025-01-08

## 2024-12-11 RX ORDER — LIDOCAINE HYDROCHLORIDE 10 MG/ML
2 INJECTION, SOLUTION EPIDURAL; INFILTRATION; INTRACAUDAL; PERINEURAL ONCE
Status: COMPLETED | OUTPATIENT
Start: 2024-12-11 | End: 2024-12-11

## 2024-12-11 RX ORDER — EPINEPHRINE 0.3 MG/.3ML
0.3 INJECTION SUBCUTANEOUS EVERY 5 MIN PRN
Status: CANCELLED | OUTPATIENT
Start: 2025-01-08

## 2024-12-11 RX ORDER — DIPHENHYDRAMINE HYDROCHLORIDE 50 MG/ML
50 INJECTION INTRAMUSCULAR; INTRAVENOUS AS NEEDED
Status: CANCELLED | OUTPATIENT
Start: 2025-01-08

## 2024-12-11 RX ORDER — ALBUTEROL SULFATE 0.83 MG/ML
3 SOLUTION RESPIRATORY (INHALATION) AS NEEDED
Status: DISCONTINUED | OUTPATIENT
Start: 2024-12-11 | End: 2024-12-11 | Stop reason: HOSPADM

## 2024-12-11 RX ORDER — LIDOCAINE HYDROCHLORIDE 10 MG/ML
2 INJECTION, SOLUTION EPIDURAL; INFILTRATION; INTRACAUDAL; PERINEURAL ONCE
Status: CANCELLED | OUTPATIENT
Start: 2025-01-08

## 2024-12-11 RX ORDER — EPINEPHRINE 0.3 MG/.3ML
0.3 INJECTION SUBCUTANEOUS EVERY 5 MIN PRN
Status: DISCONTINUED | OUTPATIENT
Start: 2024-12-11 | End: 2024-12-11 | Stop reason: HOSPADM

## 2024-12-11 RX ORDER — FAMOTIDINE 10 MG/ML
20 INJECTION INTRAVENOUS ONCE AS NEEDED
Status: DISCONTINUED | OUTPATIENT
Start: 2024-12-11 | End: 2024-12-11 | Stop reason: HOSPADM

## 2024-12-11 ASSESSMENT — PAIN SCALES - GENERAL: PAINLEVEL_OUTOF10: 0-NO PAIN

## 2024-12-13 ENCOUNTER — HOSPITAL ENCOUNTER (OUTPATIENT)
Dept: RADIATION ONCOLOGY | Facility: CLINIC | Age: 34
Setting detail: RADIATION/ONCOLOGY SERIES
Discharge: HOME | End: 2024-12-13
Payer: COMMERCIAL

## 2024-12-13 VITALS
TEMPERATURE: 98.4 F | SYSTOLIC BLOOD PRESSURE: 104 MMHG | OXYGEN SATURATION: 99 % | DIASTOLIC BLOOD PRESSURE: 71 MMHG | BODY MASS INDEX: 27.79 KG/M2 | HEART RATE: 75 BPM | RESPIRATION RATE: 18 BRPM | WEIGHT: 193.7 LBS

## 2024-12-13 DIAGNOSIS — Z08 ENCOUNTER FOR FOLLOW-UP SURVEILLANCE OF BREAST CANCER: Primary | ICD-10-CM

## 2024-12-13 DIAGNOSIS — C50.911 CARCINOMA OF RIGHT BREAST METASTATIC TO AXILLARY LYMPH NODE (MULTI): ICD-10-CM

## 2024-12-13 DIAGNOSIS — Z85.3 ENCOUNTER FOR FOLLOW-UP SURVEILLANCE OF BREAST CANCER: Primary | ICD-10-CM

## 2024-12-13 DIAGNOSIS — C77.3 CARCINOMA OF RIGHT BREAST METASTATIC TO AXILLARY LYMPH NODE (MULTI): ICD-10-CM

## 2024-12-13 DIAGNOSIS — Z79.811 AROMATASE INHIBITOR USE: ICD-10-CM

## 2024-12-13 DIAGNOSIS — R59.0 AXILLARY LYMPHADENOPATHY: ICD-10-CM

## 2024-12-13 PROCEDURE — 99214 OFFICE O/P EST MOD 30 MIN: CPT | Performed by: NURSE PRACTITIONER

## 2024-12-13 ASSESSMENT — PAIN SCALES - GENERAL: PAINLEVEL_OUTOF10: 0-NO PAIN

## 2024-12-13 NOTE — ADDENDUM NOTE
Encounter addended by: ALTA Vargas-LEDY on: 12/13/2024 4:27 PM   Actions taken: Order list changed, Diagnosis association updated

## 2024-12-13 NOTE — PROGRESS NOTES
Radiation Oncology Follow-Up    Patient Name:  Nathaly Powell  MRN:  [id number]  :  1990    Referring Provider: Ana Dickens, APR*  Primary Care Provider: Orion Menjivar DO  Care Team: Patient Care Team:  Orion Menjivar DO as PCP - General (Sports Medicine)  Mary Kay Donnelly MD as PCP - [facility] PCP    Date of Service: 2024     Cancer Staging:          Breast         AJCC Edition: 8th (AJCC), Diagnosis Date: 2019, IIIA, cT2 cN1 cM0 G3     Treatment Synopsis:    34-year-old female with a clinical T2 N1 M0, stage IIB invasive ductal carcinoma of the right breast status post neoadjuvant dose dense AC ×4 followed  by weekly Taxol ×12. Following chemotherapy she underwent a right mastectomy with sentinel lymph node biopsy and risk reducing contralateral mastectomy. Bilateral mastectomy and right SLN 19. In the right breast was residual microinvasive carcinoma.  LVI remained. 0/3 SLN involved. Left breast benign. Pathology DCIS and invasive ductal carcinoma grade 2-3 ER 95% OH 0% HER2 2+ not amplified.     Following surgery, she received radiation to the right chest wall and comprehensive jewel chain consisting of  a dose of 50 Gy with incision boost to 60 Gy completed on 20.      Goserelin initiated 19. Anastrozole initiated 19 with Zometa.  Genetic testing result showed PAL B2 Liberian mutation     On 10/14/20 - Second stage b/l breast reconstruction with BRIDGET flap muscle sparing. Due to this surgery, she received one dose of Trelstar IM instead of Zoladex in October. Then she will go back to Zoladex but transition to monthly     Completion of Zometa x7 doses Dec 2022     SUBJECTIVE  History of Present Illness:   Nathaly Powell is here today for routine follow up post radiation.  She is doing well overall. She is taking anastrozole and tolerating it without adverse effects except some  trigger finger in her thumbs at times.  Continues on goserelin monthly but  she just received her last injection. She change endocrine therapy to tamoxifen in January. Denies lymphedema or difficulty with ROM of UEs. 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 Denies headaches, fever, chills, cough, SOB, chest pain, N/V, or bony pain. DEXA scan normal.     Review of Systems:    Review of Systems   All other systems reviewed and are negative.    Performance Status:   The Karnofsky performance scale today is 100, Fully active, able to carry on all pre-disease performed without restriction (ECOG equivalent 0).      OBJECTIVE  Vital Signs:  /71 (BP Location: Left arm, Patient Position: Sitting, BP Cuff Size: Adult)   Pulse 75   Temp 36.9 °C (98.4 °F) (Core)   Resp 18   Wt 87.9 kg (193 lb 11.2 oz)   SpO2 99%   BMI 27.79 kg/m²      Current Outpatient Medications:     acyclovir (Zovirax) 400 mg tablet, Take 1 tablet (400 mg) by mouth 2 times a day., Disp: , Rfl:     anastrozole (Arimidex) 1 mg tablet, Take 1 tablet (1 mg total) by mouth once daily., Disp: 90 tablet, Rfl: 3    cholecalciferol (Vitamin D-3) 25 MCG (1000 UT) tablet, Take by mouth., Disp: , Rfl:     lysine 500 mg tablet, Take 1 tablet (500 mg) by mouth once daily., Disp: , Rfl:     magnesium 250 mg tablet, Take by mouth., Disp: , Rfl:     multivitamin tablet, Take 1 tablet by mouth once daily., Disp: , Rfl:     pyridoxine (Vitamin B-6) 25 mg tablet, Take 1 tablet (25 mg) by mouth once daily., Disp: , Rfl:     zoledronic acid (Zometa) 4 mg injection, Infuse into a venous catheter., Disp: , Rfl:      Physical Exam  Constitutional:       Appearance: Normal appearance.   HENT:      Head: Normocephalic and atraumatic.      Nose: Nose normal.      Mouth/Throat:      Mouth: Mucous membranes are moist.      Pharynx: Oropharynx is clear.   Eyes:      Conjunctiva/sclera: Conjunctivae normal.      Pupils: Pupils are equal, round, and reactive to light.   Cardiovascular:      Rate and Rhythm: Normal  rate and regular rhythm.      Heart sounds: Normal heart sounds.   Pulmonary:      Effort: Pulmonary effort is normal.      Breath sounds: Normal breath sounds.   Chest:   Breasts:     Right: Absent.      Left: Absent.      Comments: Bilateral TRAM reconstruction - no suspicious palpable nodules or lesions.   Abdominal:      Palpations: Abdomen is soft.   Musculoskeletal:         General: No swelling. Normal range of motion.      Cervical back: Normal range of motion and neck supple.   Lymphadenopathy:      Cervical: No cervical adenopathy.      Upper Body:      Right upper body: Axillary adenopathy (Approx 1 cm palpable nodularity right axilla) present. No supraclavicular adenopathy.      Left upper body: No supraclavicular or axillary adenopathy.   Skin:     General: Skin is warm and dry.   Neurological:      General: No focal deficit present.      Mental Status: She is alert and oriented to person, place, and time.   Psychiatric:         Mood and Affect: Mood normal.         Behavior: Behavior normal.         ASSESSMENT/PLAN:  34 y.o. female with right sided breast cancer s/p neoadjuvant chemotherapy followed by mastectomy and left prophylactic mastectomy followed by bilateral TRAM reconstruction. Doing well post treatment and she is pleased with breast reconstruction. She will change to Tamoxifen in January.     There is palpable approx 1 cm nodularity right axilla, non tender.  Will send for US. I will follow up with her after results completed.  Radiation follow up in 12 mo for office visit.  Call us with any concerns.        Radha Dickens CNP  [phone]

## 2024-12-16 ENCOUNTER — HOSPITAL ENCOUNTER (OUTPATIENT)
Dept: RADIOLOGY | Facility: CLINIC | Age: 34
Discharge: HOME | End: 2024-12-16
Payer: COMMERCIAL

## 2024-12-16 DIAGNOSIS — R59.0 AXILLARY LYMPHADENOPATHY: ICD-10-CM

## 2024-12-16 PROCEDURE — 76642 ULTRASOUND BREAST LIMITED: CPT | Mod: RT

## 2024-12-20 ENCOUNTER — TELEPHONE (OUTPATIENT)
Dept: HEMATOLOGY/ONCOLOGY | Facility: HOSPITAL | Age: 34
End: 2024-12-20

## 2024-12-20 ENCOUNTER — TELEPHONE (OUTPATIENT)
Dept: HEMATOLOGY/ONCOLOGY | Facility: HOSPITAL | Age: 34
End: 2024-12-20
Payer: COMMERCIAL

## 2024-12-20 DIAGNOSIS — C77.3 CARCINOMA OF RIGHT BREAST METASTATIC TO AXILLARY LYMPH NODE (MULTI): Primary | ICD-10-CM

## 2024-12-20 DIAGNOSIS — C50.911 CARCINOMA OF RIGHT BREAST METASTATIC TO AXILLARY LYMPH NODE (MULTI): Primary | ICD-10-CM

## 2024-12-20 RX ORDER — TAMOXIFEN CITRATE 20 MG/1
20 TABLET ORAL DAILY
Qty: 90 TABLET | Refills: 3 | Status: SHIPPED | OUTPATIENT
Start: 2024-12-20 | End: 2025-12-20

## 2024-12-20 NOTE — TELEPHONE ENCOUNTER
Reviewed plan of transition off of anastrozole and ovarian suppression to tamoxifen. Verbalized understanding and reviewed next apt March 4 2025

## 2024-12-20 NOTE — PROGRESS NOTES
Per plan to complete 5 years ovarian suppression, will transition to tamoxifen. Orders sent, VM left for patient

## 2025-01-06 DIAGNOSIS — B00.9 HSV-1 INFECTION: Primary | ICD-10-CM

## 2025-01-06 RX ORDER — ACYCLOVIR 400 MG/1
400 TABLET ORAL 2 TIMES DAILY
Qty: 90 TABLET | Refills: 0 | Status: SHIPPED | OUTPATIENT
Start: 2025-01-06

## 2025-01-08 ENCOUNTER — APPOINTMENT (OUTPATIENT)
Dept: HEMATOLOGY/ONCOLOGY | Facility: CLINIC | Age: 35
End: 2025-01-08
Payer: COMMERCIAL

## 2025-02-10 ENCOUNTER — OFFICE VISIT (OUTPATIENT)
Dept: OBSTETRICS AND GYNECOLOGY | Facility: CLINIC | Age: 35
End: 2025-02-10
Payer: COMMERCIAL

## 2025-02-10 VITALS
HEART RATE: 69 BPM | BODY MASS INDEX: 27.79 KG/M2 | SYSTOLIC BLOOD PRESSURE: 105 MMHG | DIASTOLIC BLOOD PRESSURE: 68 MMHG | HEIGHT: 70 IN

## 2025-02-10 DIAGNOSIS — Z91.89 HIGH RISK OF OVARIAN CANCER: ICD-10-CM

## 2025-02-10 DIAGNOSIS — Z01.411 ENCOUNTER FOR WELL WOMAN EXAM WITH ABNORMAL FINDINGS: Primary | ICD-10-CM

## 2025-02-10 DIAGNOSIS — C50.919 PALB2-RELATED BREAST CANCER IN FEMALE: ICD-10-CM

## 2025-02-10 DIAGNOSIS — N93.9 ABNORMAL UTERINE BLEEDING (AUB): ICD-10-CM

## 2025-02-10 PROCEDURE — 99395 PREV VISIT EST AGE 18-39: CPT | Performed by: OBSTETRICS & GYNECOLOGY

## 2025-02-10 PROCEDURE — 99213 OFFICE O/P EST LOW 20 MIN: CPT | Performed by: OBSTETRICS & GYNECOLOGY

## 2025-02-10 PROCEDURE — 1036F TOBACCO NON-USER: CPT | Performed by: OBSTETRICS & GYNECOLOGY

## 2025-02-10 PROCEDURE — 99213 OFFICE O/P EST LOW 20 MIN: CPT | Mod: 25 | Performed by: OBSTETRICS & GYNECOLOGY

## 2025-02-10 ASSESSMENT — COLUMBIA-SUICIDE SEVERITY RATING SCALE - C-SSRS
6. HAVE YOU EVER DONE ANYTHING, STARTED TO DO ANYTHING, OR PREPARED TO DO ANYTHING TO END YOUR LIFE?: NO
2. HAVE YOU ACTUALLY HAD ANY THOUGHTS OF KILLING YOURSELF?: NO
1. IN THE PAST MONTH, HAVE YOU WISHED YOU WERE DEAD OR WISHED YOU COULD GO TO SLEEP AND NOT WAKE UP?: NO

## 2025-02-10 ASSESSMENT — PATIENT HEALTH QUESTIONNAIRE - PHQ9
SUM OF ALL RESPONSES TO PHQ9 QUESTIONS 1 AND 2: 0
1. LITTLE INTEREST OR PLEASURE IN DOING THINGS: NOT AT ALL
2. FEELING DOWN, DEPRESSED OR HOPELESS: NOT AT ALL

## 2025-02-10 NOTE — PROGRESS NOTES
Assessment   # APE  - Nl pelvic exam  - Pap UTD 2023 --> next due 2028  - Nl post-reconstruction breasts, follows with [facility]  - routine CVD screening by PCP, last A1C , lipid and CMP within past year  - Last osteoporosis screening done , T score -1.7 in spine, on Zometa --> repeat screening around age 38.      # PAB2 mutation  - see discussion from 2021  - At this time, BSO not recommended given questionable risk of ovarian cancer with her mutation and known risks of premature menopause  - Pt would like option of annual ovarian cancer surveillance with bimanual exam + ultrasound  - MIGS consult to discuss TLH-BS     # Contraception - Paragard in place, though strings retracted on exam     # Medication-induced menopausal sx  - VSM tolerable. GSM manageable with lube. Pt can message me if she wants to start Effexor for VSM as it does seem worse on tamoxifen than it was on anastrozole.     RV 1 year    Nohemi Haskins MD     Subjective   36 YO who presents for an annual gynecological exam. PCP = DO ANEL Perez Concerns: none     Other Concerns:  PALB2 mutation: annual ovarian ultrasounds for surveillance after discussion with patient. BSO not recommended at this time (see discussion in ). Last ultrasound 2024 did not visualize bilat ovaries, but likely reflects that there was also no significant ovarian pathology, norah given the absence of symptoms. Has some hot flashes on tamoxifen but tolerable for now. Patient may want TLH-BS now that she is on tamoxifen and worried about resumption of menses (norah abn menses), also she doesn't want kids and is asking about TL. Also worried about incr risk of uterine cancer with her particular mutation. Recommend MIGS consult.     OBHx:   May 2016 - 34+5 weeks. PLTCS for arrest of descent with PPH. BOY Yohannes Grissom, 5#7oz.  2017 - 38 weeks. VAVD. Girl, Ava Baum, 6# 11oz. No repair.      GynHx:  Menstrual Hx:  "amenorrheic still  STIs: denies h/o  Contraception: Paragard Feb 2020  Sexual History/Complaints: Heterosexual, monogamous, no complaints as long as she uses lube     PapHx:  Dec 2018 - neg cotest  Feb 2023 - neg cotest     Preventative:  Last mammogram: followed by breast surg  Last Colonoscopy: due by age 43  DM Screening: A1C 5.4% in 2022  DEXA: T score -1.7 spine in 2023, otherwise nl. On Zometa.  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 s/p bilat mastectomy + neoadjuvant chemoradiation. Completed goserelin + anastrazole x 5 years Jan 2025, now for tamoxifen 20mg daily x 5 years. Took zoledronic acid x 7 doses in 2022 for bone health.   Neuropathy - no longer on Gabapentin (not particularly effective)     PSH:   2016 - PLTCS  11/1/19 - bilateral simple mastectomies and right axillary Magseed localized sentinel lymph node biopsy  10/14/2020 - bilateral MS-TRAM reconstruction     FamHx:  Strong fam hx of breast cancer (mother at age 41)  Grandfather with prostate cancer    Objective   /68   Pulse 69   Ht 1.778 m (5' 10\")   BMI 27.79 kg/m²     Constitutional: Alert and in no acute distress. Well developed, well nourished.      Head and Face: Head and face: Normal.       Eyes: Normal external exam - nonicteric sclera, extraocular movements intact (EOMI) and no ptosis.      Ears, Nose, Mouth, and Throat: External inspection of ears and nose: Normal.       Neck: No neck asymmetry. Supple.      Pulmonary: No respiratory distress.      Abdomen: Soft nontender; no abdominal mass palpated. No organomegaly. No hernias.    "   Genitourinary: External genitalia: Normal. Palpation of lymph nodes in groin: No inguinal lymphadenopathy. Bartholin's Urethral and Skenes Glands: Normal. Urethra: Normal. Bladder: Normal on palpation. Vagina: Normal. Cervix: Normal. Patient has an IUD. IUD String absent. Uterus: Normal. Right Adnexa/parametria: Normal. Left Adnexa/parametria: Normal. Inspection of Perianal Area: Normal.      Musculoskeletal: No joint swelling seen, normal movements of all extremities.      Skin: Normal skin color and pigmentation, normal skin turgor, and no rash.      Psychiatric: Alert and oriented x 3. Affect normal to patient baseline. Mood: Appropriate.

## 2025-02-19 ENCOUNTER — APPOINTMENT (OUTPATIENT)
Dept: OBSTETRICS AND GYNECOLOGY | Facility: CLINIC | Age: 35
End: 2025-02-19
Payer: COMMERCIAL

## 2025-03-03 PROBLEM — Z79.811 AROMATASE INHIBITOR USE: Status: RESOLVED | Noted: 2024-02-27 | Resolved: 2025-03-03

## 2025-03-03 PROBLEM — Z79.810 ENCOUNTER FOR MONITORING TAMOXIFEN THERAPY: Status: ACTIVE | Noted: 2025-03-03

## 2025-03-03 PROBLEM — E28.39 SUPPRESSION OF OVARIAN SECRETION: Status: RESOLVED | Noted: 2024-02-27 | Resolved: 2025-03-03

## 2025-03-03 PROBLEM — Z51.81 ENCOUNTER FOR MONITORING TAMOXIFEN THERAPY: Status: ACTIVE | Noted: 2025-03-03

## 2025-03-04 ENCOUNTER — LAB (OUTPATIENT)
Dept: LAB | Facility: CLINIC | Age: 35
End: 2025-03-04
Payer: COMMERCIAL

## 2025-03-04 ENCOUNTER — OFFICE VISIT (OUTPATIENT)
Dept: HEMATOLOGY/ONCOLOGY | Facility: CLINIC | Age: 35
End: 2025-03-04
Payer: COMMERCIAL

## 2025-03-04 VITALS
HEART RATE: 69 BPM | OXYGEN SATURATION: 99 % | TEMPERATURE: 97.9 F | DIASTOLIC BLOOD PRESSURE: 66 MMHG | SYSTOLIC BLOOD PRESSURE: 119 MMHG | WEIGHT: 197.09 LBS | BODY MASS INDEX: 28.28 KG/M2

## 2025-03-04 DIAGNOSIS — Z08 ENCOUNTER FOR FOLLOW-UP SURVEILLANCE OF BREAST CANCER: ICD-10-CM

## 2025-03-04 DIAGNOSIS — C77.3 CARCINOMA OF RIGHT BREAST METASTATIC TO AXILLARY LYMPH NODE (MULTI): ICD-10-CM

## 2025-03-04 DIAGNOSIS — Z51.81 ENCOUNTER FOR MONITORING TAMOXIFEN THERAPY: ICD-10-CM

## 2025-03-04 DIAGNOSIS — Z90.13 STATUS POST BILATERAL MASTECTOMY: ICD-10-CM

## 2025-03-04 DIAGNOSIS — Z79.810 ENCOUNTER FOR MONITORING TAMOXIFEN THERAPY: ICD-10-CM

## 2025-03-04 DIAGNOSIS — C50.911 CARCINOMA OF RIGHT BREAST METASTATIC TO AXILLARY LYMPH NODE (MULTI): ICD-10-CM

## 2025-03-04 DIAGNOSIS — Z85.3 ENCOUNTER FOR FOLLOW-UP SURVEILLANCE OF BREAST CANCER: ICD-10-CM

## 2025-03-04 DIAGNOSIS — E28.39 SUPPRESSION OF OVARIAN SECRETION: ICD-10-CM

## 2025-03-04 LAB
ALBUMIN SERPL BCP-MCNC: 4.2 G/DL (ref 3.4–5)
ALP SERPL-CCNC: 61 U/L (ref 33–110)
ALT SERPL W P-5'-P-CCNC: 28 U/L (ref 7–45)
ANION GAP SERPL CALC-SCNC: 11 MMOL/L (ref 10–20)
AST SERPL W P-5'-P-CCNC: 21 U/L (ref 9–39)
BASOPHILS # BLD AUTO: 0.03 X10*3/UL (ref 0–0.1)
BASOPHILS NFR BLD AUTO: 0.6 %
BILIRUB SERPL-MCNC: 0.4 MG/DL (ref 0–1.2)
BUN SERPL-MCNC: 16 MG/DL (ref 6–23)
CALCIUM SERPL-MCNC: 9.4 MG/DL (ref 8.6–10.6)
CHLORIDE SERPL-SCNC: 106 MMOL/L (ref 98–107)
CO2 SERPL-SCNC: 28 MMOL/L (ref 21–32)
CREAT SERPL-MCNC: 0.99 MG/DL (ref 0.5–1.05)
EGFRCR SERPLBLD CKD-EPI 2021: 75 ML/MIN/1.73M*2
EOSINOPHIL # BLD AUTO: 0.08 X10*3/UL (ref 0–0.7)
EOSINOPHIL NFR BLD AUTO: 1.6 %
ERYTHROCYTE [DISTWIDTH] IN BLOOD BY AUTOMATED COUNT: 12.4 % (ref 11.5–14.5)
ESTRADIOL SERPL-MCNC: <19 PG/ML
FSH SERPL-ACNC: 5.8 IU/L
GLUCOSE SERPL-MCNC: 89 MG/DL (ref 74–99)
HCT VFR BLD AUTO: 37.2 % (ref 36–46)
HGB BLD-MCNC: 12.3 G/DL (ref 12–16)
IMM GRANULOCYTES # BLD AUTO: 0 X10*3/UL (ref 0–0.7)
IMM GRANULOCYTES NFR BLD AUTO: 0 % (ref 0–0.9)
LH SERPL-ACNC: 1 IU/L
LYMPHOCYTES # BLD AUTO: 1.75 X10*3/UL (ref 1.2–4.8)
LYMPHOCYTES NFR BLD AUTO: 34.8 %
MCH RBC QN AUTO: 28.7 PG (ref 26–34)
MCHC RBC AUTO-ENTMCNC: 33.1 G/DL (ref 32–36)
MCV RBC AUTO: 87 FL (ref 80–100)
MONOCYTES # BLD AUTO: 0.33 X10*3/UL (ref 0.1–1)
MONOCYTES NFR BLD AUTO: 6.6 %
NEUTROPHILS # BLD AUTO: 2.84 X10*3/UL (ref 1.2–7.7)
NEUTROPHILS NFR BLD AUTO: 56.4 %
NRBC BLD-RTO: NORMAL /100{WBCS}
PLATELET # BLD AUTO: 199 X10*3/UL (ref 150–450)
POTASSIUM SERPL-SCNC: 4.2 MMOL/L (ref 3.5–5.3)
PROT SERPL-MCNC: 7 G/DL (ref 6.4–8.2)
RBC # BLD AUTO: 4.29 X10*6/UL (ref 4–5.2)
SODIUM SERPL-SCNC: 141 MMOL/L (ref 136–145)
WBC # BLD AUTO: 5 X10*3/UL (ref 4.4–11.3)

## 2025-03-04 PROCEDURE — 83001 ASSAY OF GONADOTROPIN (FSH): CPT

## 2025-03-04 PROCEDURE — 99214 OFFICE O/P EST MOD 30 MIN: CPT | Performed by: NURSE PRACTITIONER

## 2025-03-04 PROCEDURE — 1036F TOBACCO NON-USER: CPT | Performed by: NURSE PRACTITIONER

## 2025-03-04 PROCEDURE — 85025 COMPLETE CBC W/AUTO DIFF WBC: CPT

## 2025-03-04 PROCEDURE — 83520 IMMUNOASSAY QUANT NOS NONAB: CPT

## 2025-03-04 PROCEDURE — 82670 ASSAY OF TOTAL ESTRADIOL: CPT

## 2025-03-04 PROCEDURE — 80053 COMPREHEN METABOLIC PANEL: CPT

## 2025-03-04 PROCEDURE — 83002 ASSAY OF GONADOTROPIN (LH): CPT

## 2025-03-04 PROCEDURE — 36415 COLL VENOUS BLD VENIPUNCTURE: CPT

## 2025-03-04 ASSESSMENT — PATIENT HEALTH QUESTIONNAIRE - PHQ9
2. FEELING DOWN, DEPRESSED OR HOPELESS: NOT AT ALL
SUM OF ALL RESPONSES TO PHQ9 QUESTIONS 1 & 2: 0
1. LITTLE INTEREST OR PLEASURE IN DOING THINGS: NOT AT ALL

## 2025-03-04 ASSESSMENT — PAIN SCALES - GENERAL: PAINLEVEL_OUTOF10: 0-NO PAIN

## 2025-03-04 NOTE — PROGRESS NOTES
Patient ID: Nathaly Powell is a 35 y.o. female.  BREAST CANCER DIAGNOSIS:    Breast         AJCC Edition: 8th (AJCC), Diagnosis Date: 2019, IIIA, cT2 cN1 cM0 G3        Treatment History:    1 Screening mammogram 3/24/19 showed suspicious right breast calcifications over 5 cm  2. Core biopsy right breast 3/28/19 showed DCIS and invasive ductal carcinoma grade 2-3 ER 95% MN 0% HER2 2+ not amplified. Core biopsy right axillary LN 4/15/19 positive for adenocarcinoma  3. Metastatic work up 19 negative bone scan and CT other than breast and LN findings right axilla  4. Preoperative chemotherapy with Adriamycin/Cytoxan x4 dose dense followed by weekly Paclitaxel x 12 initiated 5/10/19 and completed 19.   5. Genetic testing result showed PAL B2 [place] mutation and variant of unknown significance in CHEK2  6. She had bilateral mastectomy and right SLN 19. In the right breast was residual microinvasive carcinoma. LVI remained. 0/3 SLN involved. Left breast benign   7. Goserelin initiated 19. Anastrozole started 19 with Zometa  8. Radiation to R chest wall completed 2020.   9. On 10/14/20 - Second stage b/l breast reconstruction with BRIDGET flap muscle sparing. Due to this surgery, she received one dose of Trelstar IM instead of Zoladex in October. Then she will go back to Zoladex but transition to monthly   10. Completion of Zometa x7 doses Dec 2022       Past Medical History: Nathaly has a past medical history of Breast cancer (Multi) (), Encounter for supervision of other normal pregnancy, third trimester (2017), History of uterine scar from previous surgery (2019), Personal history of malignant neoplasm of breast (2020), and Presence of (intrauterine) contraceptive device.  Surgical History:  Nathaly has a past surgical history that includes Other surgical history (2020);  section, classic (2019); Other surgical history (2019); and CT angio  abdomen pelvis w and or wo IV IV contrast (6/22/2020).  Social History:  Nathaly reports that she has never smoked. She has never used smokeless tobacco. She reports current alcohol use. She reports that she does not use drugs.  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 4 yr old daughter    Family History:    Family History   Problem Relation Name Age of Onset    Breast cancer Mother      Prostate cancer Maternal Grandfather       Family Oncology History:  Cancer-related family history includes Breast cancer in her mother; Prostate cancer in her maternal grandfather.    HISTORY OF PRESENT ILLNESS:  Nathaly Powell is a 35 y.o. female who is here for Breast cancer treatment follow-up and survivorship. Today I have reviewed with the patient I will be conducting a clinical physical breast exam. Patient has declined a second medical professional today during the exam as a chapperone.     She has no breast cancer concerns today. She has been on tamoxifen since Jan 2025. She reports increase in hot flashes and some cramping but no breakthrough bleeding. She notices increase in urinary urgency and is drinking more water. She notices more aches on tamoxifen specifically in ankles, but otherwise she denies any new or unexplained bone aches or pains    She denies any skin lesions or masses, oral sores lesions or infections.      She denies any chest pain or breathing issues, no cough or short of breath.      She denies any vision issues, headache issues, dizziness or loss of balance, falls. Denies any substantial issues with neuropathy     She reports a normal appetite. She has normal bowel movements and normal urination. She reports normal urination     She intermittent issues with sleep. She denies any substantial fatigue     Vitamin D 2,000 International Unit(s)  daily and calcium chews daily, Vitamin B6 for neuropathy. She also takes magnesium.     Review of Systems - Oncology  ROS 14 points performed, See HPI for exceptions    OBJECTIVE:    VS / Pain:  /66 (BP Location: Right arm, Patient Position: Sitting, BP Cuff Size: Small adult)   Pulse 69   Temp 36.6 °C (97.9 °F) (Temporal)   Wt 89.4 kg (197 lb 1.5 oz)   SpO2 99%   BMI 28.28 kg/m²   BSA: 2.1 meters squared   Pain Scale: 0  ECO- Fully active, able to carry on all pre-disease performance w/o restriction.       Physical Exam  Constitutional: Well developed, awake/alert/oriented x4, no distress, alert and cooperative  EYES: Sclera clear  ENMT: mucous membranes moist, no apparent injury, no lesions seen  Head/Neck: Neck supple, no apparent injury, thyroid without mass or tenderness, No JVD, trachea midline, no bruits  Respiratory / Thoracic: Patent airways, clear to all lobes, normal breath sounds with good chest expansion, thorax symmetric.  Cardiovascular: Regular, rate and rhythm, no murmurs, 2+ equal pulses of the extremities, normal auscultated S 1and S 2  GI: Nondistended, soft, non-tender, no rebound tenderness or guarding, no masses palpable, no organomegaly, +BS, no bruits  Musculoskeletal: ROM intact, no joint swelling, normal strength, no spinal tenderness  Extremities: normal extremities, no cyanosis edema, contusions or wounds, no clubbing  Neurological: alert and oriented x4, intact senses, motor, response and reflexes, normal strength  Breast: Bilateral mastectomy BRIDGET flap reconstruction well healed, no masses, or lesions  Lymphatic: No cervical, supraclavicular, infraclavicular or axillary lymphadenopathy  Psychological: Appropriate and talkative mood and behavior  Skin: Warm and dry, no lesions, no rashes, no jaundice          Diagnostic Results   BI US breast limited right  Narrative: Interpreted By:  Jania Farrell,   STUDY:  BI US BREAST LIMITED RIGHT;  2024 8:04 am       ACCESSION NUMBER(S):  [id number]      ORDERING CLINICIAN:  JULES MARTINEZ      INDICATION:  History of right breast cancer status post bilateral mastectomy in  2019. Patient presents for evaluation of a palpable lump in the right  axilla.      ,R59.0 Localized enlarged lymph nodes      COMPARISON:  None.      FINDINGS:  ULTRASOUND: Targeted ultrasound was performed of the right axilla at  the area of palpable lump by a registered sonographer. A  morphologically normal lymph node is seen adjacent to the axillary  scar. It measures 0.5 x 0.5 x 0.6 cm. An additional morphologically  normal lymph node is seen. There are no suspicious findings.      Impression: Morphologically normal lymph node at the area of palpable lump near  the right axillary scar. No suspicious findings are visualized.      BI-RADS CATEGORY:  BI-RADS Category:  2 Benign.  Recommendation:  Clinical follow-up.  Recommended Date:  At the discretion of the ordering provider.  Laterality:  Bilateral.      For any future breast imaging appointments, please call [phone]).          MACRO:  None      Signed by: Jania Farrell 12/16/2024 11:34 AM  Dictation workstation:   [id number]     Xray Bone Density Dexa 1 or more sites, Axial Skeleton [Sep 1 2023 3:33PM]  FINAL REPORT   Interpreted by:  LAKSHMI GROVES HANAUER, MD   09/01/23 15:30   Facility: [facility]     MRN: [id number]   Patient Name: MARTÍNEZ AMEZCUA     STUDY:   BONE DENSITY, DEXA 1 OR MORE SITES: AXIAL SKELETN9/1/2023 1:30 pm     INDICATION:    Hx breast cancer C50.911: Breast cancer, right M89.9: Bone disorder   N95.9: Premenopausal patient E28.39: Suppression of ovarian   secretion. The patient is a 34 y/o year old F.     COMPARISON:   Previous exam is from 08/17/2021 at  which time bone mineral density   was normal..     ACCESSION NUMBER(S):   [id number]     ORDERING CLINICIAN:   LILA GARDNER     TECHNIQUE:   BONE DENSITY, DEXA 1 OR MORE SITES: AXIAL SKELETN      FINDINGS:    SPINE L1-L4   Bone Mineral Density: 1.390   T-Score 1.7 Z-Score 1.0       LEFT FEMUR -TOTAL   Bone Mineral Density: 0.973   T-Score -0.3 Z-Score -0.6       LEFT FEMUR -NECK   Bone Mineral Density: 1.007   T-Score  -0.2 Z-Score -0.4         World Health Organization (WHO) criteria for post-menopausal,    Women:   Normal: T-score at or above -1 SD   Osteopenia: T-score between -1 and -2.5 SD   Osteoporosis: T-score at or below  -2.5 SD       10-year Fracture Risk:   Major Osteoporotic Fracture Not reported   Hip Fracture Not reported     Note: If no FRAX score is reported, it is because:   Some T-score for Spine Total or Hip Total or Femoral Neck  at or below   -2.5     This exam was performed at [facility] on a GE Lunar Prodigy Advance Dexa Unit.     IMPRESSION:   DEXA: According to World Health Organization criteria,   classification  is   normal.     Followup recommended in 2 years or sooner as clinically warranted.     All images and detailed analysis are available on the  Radiology   PACS.     Transcribed By: Interface, User   Electronically Signed  By: LAKSHMI GROVES HANAUER 09/01/23 15:30     Lab Results   Component Value Date    WBC 4.8 11/13/2024    HGB 12.2 11/13/2024    HCT 35.5 (L) 11/13/2024    MCV 86 11/13/2024     11/13/2024          Chemistry    Lab Results   Component Value Date/Time     11/13/2024 1603    K 4.3 11/13/2024 1603     11/13/2024 1603    CO2 28 11/13/2024 1603    BUN 12 11/13/2024 1603    CREATININE 0.91 11/13/2024 1603    Lab Results   Component Value Date/Time    CALCIUM 9.5 11/13/2024 1603    ALKPHOS 66 11/13/2024 1603    AST 22 11/13/2024 1603    ALT 19 11/13/2024 1603    BILITOT 0.5 11/13/2024 1603                 Assessment/Plan   Carcinoma of right breast metastatic to axillary lymph node (Multi), Clinical: cT2, cN1, cM0, G3  Diagnoses and all orders for this visit:  Carcinoma of right breast  metastatic to axillary lymph node (Multi) (Primary)  Status post bilateral mastectomy  Encounter for follow-up surveillance of breast cancer  Encounter for monitoring tamoxifen therapy        Problem List Items Addressed This Visit       Carcinoma of right breast metastatic to axillary lymph node (Multi) - Primary    Status post bilateral mastectomy    Encounter for follow-up surveillance of breast cancer    Encounter for monitoring tamoxifen therapy         T2N1M0 HR + Invasive Ductal Carcinoma Right, Stage IIIA  s/p Neoadjuvant chemo with AC-T in 9/2019, Bilateral mastectomy with right SLN performed 11/1/19. She had microscopic residual disease, LVI and 0/3 SLN. There was no residual disease in the node with  biopsy site changes. Ovarian suppression started 11/19/19. Anastrozole commenced 12/2019 and Zometa every 6 months for 3 years commenced 12/17/19. Radiation therapy 1/2020. She had a Genetics consult with testing showing a mutation in the PALB2 gene,  and a variant of unknown significance in the CHEK2 gene; because of this she has annual surveillance and pelvic ultrasound with Dr. Haskins.     -Completion of adjuvant Zometa Dec 2022, received 7 doses    -Completed 5 year course of ovarian suppression in Dec 2024. Transition to tamoxifen Jan 2025. Pending hormone levels today      There is no evidence of breast cancer recurrence based on her clinical exam today.      Bone Health  Updated DEXA showing normal density Sept 2023. Continued encouragement of weight bearing exercises, ca and vitamin D. Will repeat Late 2025     General Health Maintenance   PCP [facility] annually and as needed  Is established with GYN     At least 25 minutes of direct consultation was spent with the patient today reviewing her cancer care plan, educating and answering questions regarding ongoing follow up, greater than 50% in counseling and coordination of care.      Treatment Plan:  [No matching plan found]          Thank  you for the opportunity to be involved your care.   We discussed the clinical significance of diagnosis, goals of care and treatment plan in detail.   Please do not hesitate to reach out with any questions.       -------------------------------------------------------------------------------------------------------------------------------  Emerita Griffin MSN, APRN, FNP-C  [facility]  Division of Medical Oncology- Breast   Collaborating Physician Dr. Tunde Walker   [facility] [place] [address]  Phone: [phone]  Fax: [phone]  Available via Secure Chat    Confidential Peer Review Document-  Privilege  Privileged Pursuant to [place] Revised Code Section 2305.24, .25, .251 & .252

## 2025-03-04 NOTE — PATIENT INSTRUCTIONS
Hormone levels to be completed today + Complete blood count and comprehensive metabolic panel.     Continue daily tamoxifen- Prescription good through Dec 2025     Emerita HOLM, CNP 6 month follow-up  Sept 2025 and annually      Follow up with Radha Dickens CNP December 12 2025     DEXA completed Sept 2023 with normal results, planned repeat late 2025     GYN Dr. Haskins as scheduled      PCP [facility]  annually and as needed      Call with any questions, concerns or treatment intolerance prior to next office visit [phone].

## 2025-03-08 LAB — MIS SERPL-MCNC: 0.16 NG/ML (ref 0.18–11.71)

## 2025-03-13 NOTE — PROGRESS NOTES
Division of Minimally Invasive Gynecologic Surgery  [facility]    Date: 3/18/2025 - Gynecology Consult     HISTORY OF PRESENT ILLNESS:  Nathaly Powell 35 y.o. P2 (CS x 1,  x 1) w/ PALB 2 mutation referred by Dr. Haskins for consideration of TLH-BS.     Considering TLH-BS as she is on tamoxifen for breast cancer. Additionally, she is considering oophorectomy given small increase in ovarian cancer risk the in the setting of PALB 2 mutation.     Currently using Paragard IUD for contraception. Not currently using a vaginal estrogen. She does struggle w/ pain w/ sex due to vaginal dryness.     Imaging:   - Pelvic US 2024 showed uterus measuring 3cm x 3cm x 7cm. Bilateral ovaries not evaluated.   - Repeat US recently ordered by Dr. 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, ER+, [place] negative, HER2 negative;  PALB2 mutation  PSHx: bilateral mastectomy, bilateral MS-TRAM reconstruction, tonsils/adenoids, CS x 1    PAST MEDICAL HISTORY:  Past Medical History:   Diagnosis Date    Breast cancer (Multi) 2019    Encounter for supervision of other normal pregnancy, third trimester 2017    Prenatal care, subsequent pregnancy in third trimester    History of uterine scar from previous surgery 2019    History of  delivery    Personal history of malignant neoplasm of breast 2020    History of malignant neoplasm of female breast    Presence of (intrauterine) contraceptive device     Intrauterine device       PAST SURGICAL HISTORY:  Past Surgical History:   Procedure Laterality Date     SECTION, CLASSIC  2019     Section    CT ABDOMEN PELVIS ANGIOGRAM W AND/OR WO IV CONTRAST  2020    CT ABDOMEN PELVIS ANGIOGRAM W AND/OR WO IV CONTRAST  "2020 [facility] ANCILLARY LEGACY    OTHER SURGICAL HISTORY  2020    Mastectomy bilateral    OTHER SURGICAL HISTORY  2019    Tonsillectomy with adenoidectomy       PHYSICAL EXAMINATION:  VITAL SIGNS: /65   Pulse 65   Ht 1.778 m (5' 10\")   Wt 88.7 kg (195 lb 9.6 oz)   BMI 28.07 kg/m²      Constitutional:  No acute distress, well-nourished and well-developed  HEENT: EOM grossly intact, MMM, neck supple and with full ROM  Pulm:  Effort normal. No accessory muscle usage.  No respiratory distress.  Neurological:  She is alert and oriented to person place and time.  Skin: Warm, no pallor.  Psychiatric:  She has normal mood and affect.    ASSESSMENT:  Nathaly Powell 35 y.o. P2 (CS x 1,  x 1) w/ PALB 2 mutation referred by Dr. Haskins for consideration of TLH-BS.   - We discussed the risks/benefits of both hysterectomy and oophorectomy. As she is done w/ fertility, there is minimal risk to hysterectomy apart from surgical risk. We reviewed the risks of oophorectomy/surgical menopause. We discussed common vasomotor symptoms, though she does have these already due to suppression. We discussed the fact that she will not be a good candidate for HRT due to her breast history. We also discussed the negative impact of surgical menopause on cardiovascular, orthopedic, and neurologic health as well as the increased negative outcomes regarding morbidity/mortality. She does understand these risks.   - We also discussed the small increase in ovarian cancer risk with her particular mutation (3-5% compared to typical population risk which is roughly 2%). She does understand that this increase in risk is small.   - After a long discussion of all risks and benefits, she has decided she would like to proceed w/ TLH-BSO. We discussed the standard procedure for laparoscopic hysterectomy with BSO. We reviewed the risks/benefits/alternatives to surgery. She is aware of the risk of bleeding, infection, and damage to nearby " structures, including bladder, ureters, bowel, and vasculature. She is agreeable to blood transfusion if recommended.  We reviewed the small risk of laparotomy and the fact that fertility following hysterectomy is not an option.     PLAN:  - Total laparoscopic hysterectomy, bilateral salpingo-oophorectomy, any indicated procedure. PAT: Yes. Any location.   - Consider use of vaginal estrogen for dryness and associated pain w/ sex. I will reach out to Emerita Griffin to review appropriateness of this.     Katherine Lorenzo MD  Division of Minimally Invasive Gynecologic Surgery  [facility]

## 2025-03-18 ENCOUNTER — OFFICE VISIT (OUTPATIENT)
Dept: OBSTETRICS AND GYNECOLOGY | Facility: CLINIC | Age: 35
End: 2025-03-18
Payer: COMMERCIAL

## 2025-03-18 VITALS
SYSTOLIC BLOOD PRESSURE: 103 MMHG | HEIGHT: 70 IN | BODY MASS INDEX: 28 KG/M2 | DIASTOLIC BLOOD PRESSURE: 65 MMHG | WEIGHT: 195.6 LBS | HEART RATE: 65 BPM

## 2025-03-18 DIAGNOSIS — Z79.810 LONG-TERM CURRENT USE OF TAMOXIFEN: ICD-10-CM

## 2025-03-18 DIAGNOSIS — Z71.89 SURGICAL COUNSELING VISIT: Primary | ICD-10-CM

## 2025-03-18 DIAGNOSIS — Z15.89 PALB2 GENE MUTATION POSITIVE: ICD-10-CM

## 2025-03-18 PROCEDURE — 99215 OFFICE O/P EST HI 40 MIN: CPT | Performed by: STUDENT IN AN ORGANIZED HEALTH CARE EDUCATION/TRAINING PROGRAM

## 2025-03-18 PROCEDURE — 3008F BODY MASS INDEX DOCD: CPT | Performed by: STUDENT IN AN ORGANIZED HEALTH CARE EDUCATION/TRAINING PROGRAM

## 2025-03-18 ASSESSMENT — PAIN SCALES - GENERAL: PAINLEVEL_OUTOF10: 0-NO PAIN

## 2025-03-24 DIAGNOSIS — N95.2 VAGINAL ATROPHY: ICD-10-CM

## 2025-03-24 DIAGNOSIS — N95.8 ARTIFICIAL MENOPAUSE: Primary | ICD-10-CM

## 2025-03-24 RX ORDER — GABAPENTIN 600 MG/1
600 TABLET ORAL ONCE
OUTPATIENT
Start: 2025-03-24 | End: 2025-03-24

## 2025-03-24 RX ORDER — ESTRADIOL 0.1 MG/G
CREAM VAGINAL
Qty: 42.5 G | Refills: 12 | Status: SHIPPED | OUTPATIENT
Start: 2025-03-24

## 2025-03-24 RX ORDER — CELECOXIB 400 MG/1
400 CAPSULE ORAL ONCE
OUTPATIENT
Start: 2025-03-24 | End: 2025-03-24

## 2025-03-24 RX ORDER — ACETAMINOPHEN 325 MG/1
975 TABLET ORAL ONCE
OUTPATIENT
Start: 2025-03-24 | End: 2025-03-24

## 2025-03-24 RX ORDER — HEPARIN SODIUM 5000 [USP'U]/ML
5000 INJECTION, SOLUTION INTRAVENOUS; SUBCUTANEOUS ONCE
OUTPATIENT
Start: 2025-03-24 | End: 2025-03-24

## 2025-03-24 RX ORDER — PHENAZOPYRIDINE HYDROCHLORIDE 200 MG/1
200 TABLET, FILM COATED ORAL ONCE
OUTPATIENT
Start: 2025-03-24 | End: 2025-03-24

## 2025-03-26 PROBLEM — Z15.89 PALB2 GENE MUTATION POSITIVE: Status: ACTIVE | Noted: 2025-03-18

## 2025-03-26 PROBLEM — Z79.810 LONG-TERM CURRENT USE OF TAMOXIFEN: Status: ACTIVE | Noted: 2025-03-18

## 2025-04-08 DIAGNOSIS — Z01.818 PREOP EXAMINATION: Primary | ICD-10-CM

## 2025-04-22 DIAGNOSIS — C77.3 CARCINOMA OF RIGHT BREAST METASTATIC TO AXILLARY LYMPH NODE: ICD-10-CM

## 2025-04-22 DIAGNOSIS — C50.911 CARCINOMA OF RIGHT BREAST METASTATIC TO AXILLARY LYMPH NODE: ICD-10-CM

## 2025-04-22 RX ORDER — TAMOXIFEN CITRATE 20 MG/1
20 TABLET ORAL DAILY
Qty: 90 TABLET | Refills: 3 | Status: SHIPPED | OUTPATIENT
Start: 2025-04-22 | End: 2026-04-22

## 2025-07-02 DIAGNOSIS — C50.911 CARCINOMA OF RIGHT BREAST METASTATIC TO AXILLARY LYMPH NODE: Primary | ICD-10-CM

## 2025-07-02 DIAGNOSIS — C77.3 CARCINOMA OF RIGHT BREAST METASTATIC TO AXILLARY LYMPH NODE: Primary | ICD-10-CM

## 2025-07-02 RX ORDER — ANASTROZOLE 1 MG/1
1 TABLET ORAL DAILY
Qty: 30 TABLET | Refills: 2 | Status: SHIPPED | OUTPATIENT
Start: 2025-07-02 | End: 2025-09-30

## 2025-07-07 ENCOUNTER — CLINICAL SUPPORT (OUTPATIENT)
Dept: PREADMISSION TESTING | Facility: HOSPITAL | Age: 35
End: 2025-07-07
Payer: COMMERCIAL

## 2025-07-07 RX ORDER — IBUPROFEN 400 MG/1
400 TABLET, FILM COATED ORAL EVERY 6 HOURS PRN
COMMUNITY
End: 2025-07-11 | Stop reason: HOSPADM

## 2025-07-07 RX ORDER — DEXTROMETHORPHAN HYDROBROMIDE, GUAIFENESIN 5; 100 MG/5ML; MG/5ML
650 LIQUID ORAL EVERY 8 HOURS PRN
COMMUNITY
End: 2025-07-11 | Stop reason: HOSPADM

## 2025-07-07 NOTE — CPM/PAT NURSE NOTE
CPM/PAT Nurse Note      Name: Nathaly Powell (Nathaly Powell)  /Age: 1990/35 y.o.       Medical History[1]    Surgical History[2]    Patient  reports being sexually active and has had partner(s) who are male. She reports using the following method of birth control/protection: I.U.D..    Family History[3]    Allergies[4]    Prior to Admission medications    Medication Sig Start Date End Date Taking? Authorizing Provider   acetaminophen (Tylenol 8 HOUR) 650 mg ER tablet Take 1 tablet (650 mg) by mouth every 8 hours if needed for mild pain (1 - 3). Do not crush, chew, or split.    Historical Provider, MD   acyclovir (Zovirax) 400 mg tablet Take 1 tablet (400 mg) by mouth 2 times a day.  Patient taking differently: Take 1 tablet (400 mg) by mouth if needed. 25   Orion Menjivar,    anastrozole (Arimidex) 1 mg tablet Take 1 tablet (1 mg total) by mouth once daily.  Swallow whole with a drink of water.  Patient not taking: Reported on 2025  Emerita Griffin, APRN-CNP   cholecalciferol (Vitamin D-3) 25 MCG (1000 UT) tablet Take 1 tablet (25 mcg) by mouth once daily.    Historical Provider, MD   estradiol (Estrace) 0.01 % (0.1 mg/gram) vaginal cream Use nightly for one month, then decrease to 1-2 times/week. May use applicator or place dime-sized amount onto finger & spread over vaginal opening, applying small amount inside opening. Wash hands after use. 3/24/25   Katherine Lorenzo MD   ibuprofen 400 mg tablet Take 1 tablet (400 mg) by mouth every 6 hours if needed for moderate pain (4 - 6).    Historical Provider, MD   lysine 500 mg tablet Take 1 tablet (500 mg) by mouth once daily. 20   Historical Provider, MD   magnesium 250 mg tablet Take by mouth early in the morning..    Historical Provider, MD   multivitamin tablet Take 1 tablet by mouth once daily.    Historical Provider, MD   pyridoxine (Vitamin B-6) 25 mg tablet Take 1 tablet (25 mg) by mouth once daily.    Historical  Provider, MD   tamoxifen (Nolvadex) 20 mg tablet Take 1 tablet (20 mg total) by mouth once daily.  Take with water or any other nonalcoholic drink with or without food at around the same time(s) every day. 4/22/25 7/2/25  ALTA Gonzalez-LEDY        PAT ROS     DASI Risk Score      Flowsheet Row Questionnaire Series Submission from 3/27/2025 in [facility] with Generic Provider Lenny   Can you take care of yourself (eat, dress, bathe, or use toilet)?  2.75  filed at 03/27/2025 1315   Can you walk indoors, such as around your house? 1.75  filed at 03/27/2025 1315   Can you walk a block or two on level ground?  2.75  filed at 03/27/2025 1315   Can you climb a flight of stairs or walk up a hill? 5.5  filed at 03/27/2025 1315   Can you run a short distance? 8  filed at 03/27/2025 1315   Can you do light work around the house like dusting or washing dishes? 2.7  filed at 03/27/2025 1315   Can you do moderate work around the house like vacuuming, sweeping floors or carrying groceries? 3.5  filed at 03/27/2025 1315   Can you do heavy work around the house like scrubbing floors or lifting and moving heavy furniture?  8  filed at 03/27/2025 1315   Can you do yard work like raking leaves, weeding or pushing a mower? 4.5  filed at 03/27/2025 1315   Can you have sexual relations? 5.25  filed at 03/27/2025 1315   Can you participate in moderate recreational activities like golf, bowling, dancing, doubles tennis or throwing a baseball or football? 6  filed at 03/27/2025 1315   Can you participate in strenous sports like swimming, singles tennis, football, basketball, or skiing? 7.5  filed at 03/27/2025 1315   DASI SCORE 58.2  filed at 03/27/2025 1315   METS Score (Will be calculated only when all the questions are answered) 9.9  filed at 03/27/2025 3935          Caprini DVT Assessment    No data to display       Modified Frailty Index    No data to display       [id number] Stroke Risk Points  Current as of just now         N/A 0 to 9 Points:      Last Change: N/A          The [id number] risk score (Lip JESÚS, et al. 2009. © 2010 [employer]) quantifies the risk of stroke for a patient with atrial fibrillation. For patients without atrial fibrillation or under the age of 18 this score appears as N/A. Higher score values generally indicate higher risk of stroke.        This score is not applicable to this patient. Components are not calculated.          Revised Cardiac Risk Index    No data to display       Apfel Simplified Score    No data to display       Risk Analysis Index Results This Encounter    No data found in the last 10 encounters.       Stop Bang Score      Flowsheet Row Questionnaire Series Submission from 3/27/2025 in [facility] with Generic Provider Lenny   Do you snore loudly? 0  filed at 03/27/2025 1315   Do you often feel tired or fatigued after your sleep? 0  filed at 03/27/2025 1315   Has anyone ever observed you stop breathing in your sleep? 0  filed at 03/27/2025 1315   Do you have or are you being treated for high blood pressure? 0  filed at 03/27/2025 1315   Recent BMI (Calculated) 28.1  filed at 03/27/2025 1315   Is BMI greater than 35 kg/m2? 0=No  filed at 03/27/2025 1315   Age older than 50 years old? 0=No  filed at 03/27/2025 1315   Gender - Male 0=No  filed at 03/27/2025 1315          Prodigy: High Risk  Total Score: 0          ARISCAT Score for Postoperative Pulmonary Complications    No data to display       Clarke Perioperative Risk for Myocardial Infarction or Cardiac Arrest (ANN-MARIE)    No data to display         Nurse Plan of Action:     RN screening call complete.  Reviewed allergies, medications and pharmacy, medical, surgical and social history with patient.  Chart updated.  Instructed patient to stop Vitamins and NSAID's 1 week  prior to surgery.             [1]   Past Medical History:  Diagnosis Date    Axillary lymphadenopathy     Breast cancer 2019    right breast  with lymph node metastasis, bilateral mastectomy with chemo&radiation    Monoallelic mutation of PALB2 gene     TLH-BS as she is on tamoxifen for breast cancer. Additionally, she is considering oophorectomy given small increase in ovarian cancer risk the in the setting of PALB 2 mutation.    Neuropathy     on/off as a result of radiation, was on Gabapentin, no longer needs since episodes are rare    Presence of (intrauterine) contraceptive device     Intrauterine device   [2]   Past Surgical History:  Procedure Laterality Date    BREAST RECONSTRUCTION       SECTION, LOW TRANSVERSE  2016    CT ABDOMEN PELVIS ANGIOGRAM W AND/OR WO IV CONTRAST  2020    CT ABDOMEN PELVIS ANGIOGRAM W AND/OR WO IV CONTRAST 2020 [facility]    MASTECTOMY  2020    Mastectomy bilateral    OTHER SURGICAL HISTORY  2019    Tonsillectomy with adenoidectomy    TONSILLECTOMY  1992    Tubes in ears/tonsils out   [3]   Family History  Problem Relation Name Age of Onset    Breast cancer Mother      Hypothyroidism Mother      No Known Problems Father      Prostate cancer Maternal Grandfather     [4] No Known Allergies

## 2025-07-08 ENCOUNTER — PRE-ADMISSION TESTING (OUTPATIENT)
Dept: PREADMISSION TESTING | Facility: HOSPITAL | Age: 35
End: 2025-07-08
Payer: COMMERCIAL

## 2025-07-08 ENCOUNTER — LAB (OUTPATIENT)
Dept: LAB | Facility: HOSPITAL | Age: 35
End: 2025-07-08
Payer: COMMERCIAL

## 2025-07-08 VITALS
TEMPERATURE: 96.7 F | HEIGHT: 69 IN | SYSTOLIC BLOOD PRESSURE: 116 MMHG | DIASTOLIC BLOOD PRESSURE: 64 MMHG | BODY MASS INDEX: 29.06 KG/M2 | HEART RATE: 71 BPM | OXYGEN SATURATION: 97 % | WEIGHT: 196.21 LBS | RESPIRATION RATE: 16 BRPM

## 2025-07-08 DIAGNOSIS — N93.9 ABNORMAL UTERINE AND VAGINAL BLEEDING, UNSPECIFIED: Primary | ICD-10-CM

## 2025-07-08 DIAGNOSIS — N93.9 ABNORMAL UTERINE BLEEDING: Primary | ICD-10-CM

## 2025-07-08 LAB
ABO GROUP (TYPE) IN BLOOD: NORMAL
ANION GAP SERPL CALC-SCNC: 8 MMOL/L (ref 10–20)
ANTIBODY SCREEN: NORMAL
BASOPHILS # BLD AUTO: 0.03 X10*3/UL (ref 0–0.1)
BASOPHILS NFR BLD AUTO: 0.5 %
BUN SERPL-MCNC: 12 MG/DL (ref 6–23)
CALCIUM SERPL-MCNC: 9.3 MG/DL (ref 8.6–10.3)
CHLORIDE SERPL-SCNC: 107 MMOL/L (ref 98–107)
CO2 SERPL-SCNC: 29 MMOL/L (ref 21–32)
CREAT SERPL-MCNC: 0.86 MG/DL (ref 0.5–1.05)
EGFRCR SERPLBLD CKD-EPI 2021: 89 ML/MIN/1.73M*2
EOSINOPHIL # BLD AUTO: 0.09 X10*3/UL (ref 0–0.7)
EOSINOPHIL NFR BLD AUTO: 1.6 %
ERYTHROCYTE [DISTWIDTH] IN BLOOD BY AUTOMATED COUNT: 12.3 % (ref 11.5–14.5)
GLUCOSE SERPL-MCNC: 86 MG/DL (ref 74–99)
HCT VFR BLD AUTO: 36.4 % (ref 36–46)
HGB BLD-MCNC: 12.1 G/DL (ref 12–16)
IMM GRANULOCYTES # BLD AUTO: 0.01 X10*3/UL (ref 0–0.7)
IMM GRANULOCYTES NFR BLD AUTO: 0.2 % (ref 0–0.9)
LYMPHOCYTES # BLD AUTO: 2.16 X10*3/UL (ref 1.2–4.8)
LYMPHOCYTES NFR BLD AUTO: 37.9 %
MCH RBC QN AUTO: 29.2 PG (ref 26–34)
MCHC RBC AUTO-ENTMCNC: 33.2 G/DL (ref 32–36)
MCV RBC AUTO: 88 FL (ref 80–100)
MONOCYTES # BLD AUTO: 0.4 X10*3/UL (ref 0.1–1)
MONOCYTES NFR BLD AUTO: 7 %
NEUTROPHILS # BLD AUTO: 3.01 X10*3/UL (ref 1.2–7.7)
NEUTROPHILS NFR BLD AUTO: 52.8 %
NRBC BLD-RTO: 0 /100 WBCS (ref 0–0)
PLATELET # BLD AUTO: 186 X10*3/UL (ref 150–450)
POTASSIUM SERPL-SCNC: 4.6 MMOL/L (ref 3.5–5.3)
RBC # BLD AUTO: 4.15 X10*6/UL (ref 4–5.2)
RH FACTOR (ANTIGEN D): NORMAL
SODIUM SERPL-SCNC: 139 MMOL/L (ref 136–145)
WBC # BLD AUTO: 5.7 X10*3/UL (ref 4.4–11.3)

## 2025-07-08 PROCEDURE — 85025 COMPLETE CBC W/AUTO DIFF WBC: CPT

## 2025-07-08 PROCEDURE — 86900 BLOOD TYPING SEROLOGIC ABO: CPT

## 2025-07-08 PROCEDURE — 36415 COLL VENOUS BLD VENIPUNCTURE: CPT

## 2025-07-08 PROCEDURE — 86901 BLOOD TYPING SEROLOGIC RH(D): CPT

## 2025-07-08 PROCEDURE — 86850 RBC ANTIBODY SCREEN: CPT

## 2025-07-08 PROCEDURE — 80048 BASIC METABOLIC PNL TOTAL CA: CPT

## 2025-07-08 ASSESSMENT — ENCOUNTER SYMPTOMS
MUSCULOSKELETAL NEGATIVE: 1
GASTROINTESTINAL NEGATIVE: 1
ENDOCRINE NEGATIVE: 1
NECK NEGATIVE: 1
NEUROLOGICAL NEGATIVE: 1
RESPIRATORY NEGATIVE: 1
CARDIOVASCULAR NEGATIVE: 1
CONSTITUTIONAL NEGATIVE: 1

## 2025-07-08 NOTE — H&P (VIEW-ONLY)
[facility]/PAT Evaluation       Name: Nathaly Powell (Nathaly Powell)  /Age: 1990/35 y.o.     In-Person         Date of Consult: 25    Referring Provider:  Dr. Lorenzo    Date, Surgery, and Length:  25, laparoscopic total hysterectomy, bilateral salpingo-oophorectomy, 210 minutes      Patient presents to [facility] for perioperative risk assessment prior to scheduled surgery. Patient presents with PALB 2 mutation. She is opting for TLH-BS as she is on tamoxifen for breast cancer. Additionally, she is considering oophorectomy given small increase in ovarian cancer risk the in the setting of PALB 2 mutation.       This note was created in part upon personal review of patient's medical records.    Patient endorses significant anxiety when entering OR- would appreciate anti-anxiety medication preop    Pt denies any past history of anesthetic complications such as PONV, awareness, prolonged sedation, dental damage, aspiration, cardiac arrest, difficult intubation, difficult I.V. access or unexpected hospital admissions. No history of malignant hyperthermia and or pseudocholinesterase deficiency.    No history of blood transfusions.    The patient IS NOT a [employer] and will accept blood and blood products if medically indicated.     Type and screen WAS sent.      Past Medical History:   Diagnosis Date    Axillary lymphadenopathy     Breast cancer 2019    right breast with lymph node metastasis, bilateral mastectomy with chemo&radiation    Monoallelic mutation of PALB2 gene     TLH-BS as she is on tamoxifen for breast cancer. Additionally, she is considering oophorectomy given small increase in ovarian cancer risk the in the setting of PALB 2 mutation.    Neuropathy     on/off as a result of radiation, was on Gabapentin, no longer needs since episodes are rare    Presence of (intrauterine) contraceptive device     Intrauterine device       Past Surgical History:   Procedure Laterality Date    BREAST  RECONSTRUCTION       SECTION, LOW TRANSVERSE  2016    CT ABDOMEN PELVIS ANGIOGRAM W AND/OR WO IV CONTRAST  2020    CT ABDOMEN PELVIS ANGIOGRAM W AND/OR WO IV CONTRAST 2020 [facility]    MASTECTOMY  2020    Mastectomy bilateral    OTHER SURGICAL HISTORY  2019    Tonsillectomy with adenoidectomy    TONSILLECTOMY  1992    Tubes in ears/tonsils out     Family History   Problem Relation Name Age of Onset    Breast cancer Mother      Hypothyroidism Mother      No Known Problems Father      Prostate cancer Maternal Grandfather         No Known Allergies    Social History     Tobacco Use   Smoking Status Never   Smokeless Tobacco Never     Social History     Substance and Sexual Activity   Alcohol Use Yes    Comment: occasional-rare, few times a month     Social History     Substance and Sexual Activity   Drug Use Never       Current Outpatient Medications   Medication Instructions    acetaminophen (TYLENOL 8 HOUR) 650 mg, Every 8 hours PRN    acyclovir (ZOVIRAX) 400 mg, oral, 2 times daily    anastrozole (ARIMIDEX) 1 mg, oral, Daily, Swallow whole with a drink of water.    cholecalciferol (VITAMIN D-3) 25 mcg, Daily    estradiol (Estrace) 0.01 % (0.1 mg/gram) vaginal cream Use nightly for one month, then decrease to 1-2 times/week. May use applicator or place dime-sized amount onto finger & spread over vaginal opening, applying small amount inside opening. Wash hands after use.    ibuprofen 400 mg, Every 6 hours PRN    lysine 500 mg tablet 1 tablet, Daily    magnesium 250 mg tablet Daily    multivitamin tablet 1 tablet, Daily    pyridoxine (VITAMIN B-6) 25 mg, Daily       PAT ROS:   Constitutional:   neg    Neuro/Psych:   neg    Eyes:    use of corrective lenses  Ears:   neg    Nose:   neg    Mouth:   neg    Throat:   neg    Neck:   neg    Cardio:   neg    Respiratory:   neg    Endocrine:   neg    GI:   neg    :   neg    Musculoskeletal:   neg    Hematologic:   neg   "  Skin:  neg        Physical Exam  Vitals reviewed. Physical exam within normal limits.          PAT AIRWAY:   Airway:     Mallampati::  II    Neck ROM::  Full  normal          Visit Vitals  /64   Pulse 71   Temp 35.9 °C (96.7 °F)   Resp 16   Ht 1.75 m (5' 8.9\")   Wt 89 kg (196 lb 3.4 oz)   SpO2 97%   BMI 29.06 kg/m²   OB Status Menopausal   Smoking Status Never   BSA 2.08 m²       Assessment and Plan:       Patient is a 35 year old female scheduled for laparoscopic total hysterectomy, bilateral salpingo-oophorectomy with Dr. Lorenzo on 7/11/25.      Plan    Neuro:    Significant anxiety in surgical environments- consider given anti-anxiety medication preoperatively.    Cardiovascular:    RCRI: 0 Risk of Mace: 0.9%    Caprini: 6  VTE risk: 1.9%    Patient denies any chest pain, tightness, heaviness, pressure, radiating pain, palpitations, irregular heartbeats, lightheadedness, cough, congestion, shortness of breath, MOREL, PND, near syncope, weight loss or gain.    Good functional capacity  Functional 4 Mets. Patient denies SOB walking up 2 flights of stairs      EKG in PAT not indicated.    Pulmonary:    Stop Bang score is 0 placing patient at low risk for FLORIAN  ARISCAT: <26 points, 1.6% risk of in-hospital postoperative pulmonary complication  PRODIGY: Low risk for opioid induced respiratory depression    Renal/endo:  Recommendations to avoid nephrotoxic drugs and carefully monitor fluid status to maintain euvolemia. Use dose adjusted medications as needed for the underlying level of renal function.    Heme:  Patient instructed to ambulate as soon as possible postoperatively to decrease thromboembolic risk.    Initiate mechanical DVT prophylaxis as soon as possible and initiate chemical prophylaxis when deemed safe from a bleeding standpoint post surgery.    History of breast cancer- s/p mastectomy with reconstruction and chemo. Follows with heme/onc. Will continue current immunotherapy.         Risk assessment " complete.  This patient is LOW risk candidate undergoing MODERATE risk procedure, patient is medically optimized for surgery.        Labs/testing obtained in PAT on 7/8/25: CBC, BMP, T&S    Lab Results   Component Value Date    WBC 5.7 07/08/2025    HGB 12.1 07/08/2025    HCT 36.4 07/08/2025    MCV 88 07/08/2025     07/08/2025     Lab Results   Component Value Date    GLUCOSE 86 07/08/2025    CALCIUM 9.3 07/08/2025     07/08/2025    K 4.6 07/08/2025    CO2 29 07/08/2025     07/08/2025    BUN 12 07/08/2025    CREATININE 0.86 07/08/2025         Follow up/communication: none      Preoperative medication instructions were provided and reviewed with the patient.  Any additional testing or evaluation was explained to the patient.  Nothing by mouth instructions were discussed and patient's questions were answered prior to conclusion to this encounter.  Patient verbalized understanding of preoperative instructions given in preadmission testing; discharge instructions available in EMR.    This note was dictated with speech recognition.  Minor errors may have been detected during use of speech recognition.    Charge not submitted as CPM/PAT visit within 72 hours of scheduled surgery.

## 2025-07-08 NOTE — PREPROCEDURE INSTRUCTIONS
Medication List            Accurate as of July 8, 2025  2:42 PM. Always use your most recent med list.                acetaminophen 650 mg ER tablet  Commonly known as: Tylenol 8 HOUR  Medication Adjustments for Surgery: Take/Use as prescribed     acyclovir 400 mg tablet  Commonly known as: Zovirax  Take 1 tablet (400 mg) by mouth 2 times a day.  Medication Adjustments for Surgery: Take/Use as prescribed     anastrozole 1 mg tablet  Commonly known as: Arimidex  Take 1 tablet (1 mg total) by mouth once daily.  Swallow whole with a drink of water.  Medication Adjustments for Surgery: Take/Use as prescribed     cholecalciferol 25 mcg (1,000 units) tablet  Commonly known as: Vitamin D-3  Additional Medication Adjustments for Surgery: Other (Comment)  Notes to patient: Stop today 7/8/25     estradiol 0.01 % (0.1 mg/gram) vaginal cream  Commonly known as: Estrace  Use nightly for one month, then decrease to 1-2 times/week. May use applicator or place dime-sized amount onto finger & spread over vaginal opening, applying small amount inside opening. Wash hands after use.  Medication Adjustments for Surgery: Do Not take on the morning of surgery     ibuprofen 400 mg tablet  Additional Medication Adjustments for Surgery: Other (Comment)  Notes to patient: Stop today 7/8/25     lysine 500 mg tablet  Additional Medication Adjustments for Surgery: Other (Comment)  Notes to patient: Stop today 7/8/25     magnesium 250 mg tablet  Additional Medication Adjustments for Surgery: Other (Comment)  Notes to patient: Stop today 7/8/25     multivitamin tablet  Additional Medication Adjustments for Surgery: Other (Comment)  Notes to patient: Stop today 7/8/25     pyridoxine 25 mg tablet  Commonly known as: Vitamin B-6  Medication Adjustments for Surgery: Do Not take on the morning of surgery                Preoperative Deep Breathing Exercises  Why it is important to do deep breathing exercises before my surgery?  Deep breathing  exercises strengthen your breathing muscles.  This helps you to recover after your surgery and decreases the chance of breathing complications.  How are the deep breathing exercises done?  Sit straight with your back supported.  Breathe in deeply and slowly through your nose. Your lower rib cage should expand and your abdomen may move forward.  Hold that breath for 3 to 5 seconds.  Breathe out through pursed lips, slowly and completely.  Rest and repeat 10 times every hour while awake.  Rest longer if you become dizzy or lightheaded.        CONTACT SURGEON'S OFFICE IF YOU DEVELOP:  * Fever = 100.4 F   * New respiratory symptoms (e.g. cough, shortness of breath, respiratory distress, sore throat)  * Recent loss of taste or smell  *Flu like symptoms such as headache, fatigue or gastrointestinal symptoms  * You develop any open sores, shingles, burning or painful urination   AND/OR:  * You no longer wish to have the surgery.  * Any other personal circumstances change that may lead to the need to cancel or defer this surgery.  *You were admitted to any hospital within one week of your planned procedure.    SMOKING:  *Quitting smoking can make a huge difference to your health and recovery from surgery.    *If you need help with quitting, call 0-979-QUIT-NOW.    THE DAY OF SURGERY:  *Do not eat any food after midnight the night before your surgery.   *YOU MUST drink 14 OUNCES of clear liquids TWO hours before your instructed ARRIVAL TIME to the hospital. This includes water, black tea/coffee (no milk or cream), apple juice, clear broth and electrolyte drinks (Gatorade).  Please avoid clear liquids that are red in color.   *You may chew gum/mints up to TWO hours before your surgery/procedure.    SURGICAL TIME:  *You will be contacted between 2 p.m. and 6 p.m. the business day before your surgery with your arrival time.  *If you haven't received a call by 6pm, call [phone].  *Scheduled surgery times may change and you  will be notified if this occurs-check your personal voicemail for any updates.    ON THE MORNING OF SURGERY:  *Wear comfortable, loose fitting clothing.   *Do not use moisturizers, creams, lotions or perfume.  *All jewelry and valuables should be left at home.  *Prosthetic devices such as contact lenses, hearing aids, dentures, eyelash extensions, hairpins and body piercing must be removed before surgery.    BRING WITH YOU:  *Photo ID and insurance card  *Current list of medications and allergies  *Pacemaker/Defibrillator/Heart stent cards  *CPAP machine and mask  *Slings/splints/crutches  *Copy of your complete Advanced Directive/DHPOA-if applicable  *Neurostimulator implant remote    PARKING AND ARRIVAL:  *Check in at the Main Entrance desk and let them know you are here for surgery.  *You will be directed to the 2nd floor surgical waiting area.    IF YOU ARE HAVING OUTPATIENT/SAME DAY SURGERY:  *A responsible adult MUST accompany you at the time of discharge and stay with you for 24 hours after your surgery.  *You may NOT drive yourself home after surgery.  *You may use a taxi or ride sharing service ([employer], [employer]) to return home ONLY if you are accompanied by a friend or family member.  *Instructions for resuming your medications will be provided by your surgeon.

## 2025-07-08 NOTE — CPM/PAT H&P
[facility]/PAT Evaluation       Name: Nathaly Powell (Nathaly Powell)  /Age: 1990/35 y.o.     In-Person         Date of Consult: 25    Referring Provider:  Dr. Lorenzo    Date, Surgery, and Length:  25, laparoscopic total hysterectomy, bilateral salpingo-oophorectomy, 210 minutes      Patient presents to [facility] for perioperative risk assessment prior to scheduled surgery. Patient presents with PALB 2 mutation. She is opting for TLH-BS as she is on tamoxifen for breast cancer. Additionally, she is considering oophorectomy given small increase in ovarian cancer risk the in the setting of PALB 2 mutation.       This note was created in part upon personal review of patient's medical records.    Patient endorses significant anxiety when entering OR- would appreciate anti-anxiety medication preop    Pt denies any past history of anesthetic complications such as PONV, awareness, prolonged sedation, dental damage, aspiration, cardiac arrest, difficult intubation, difficult I.V. access or unexpected hospital admissions. No history of malignant hyperthermia and or pseudocholinesterase deficiency.    No history of blood transfusions.    The patient IS NOT a [employer] and will accept blood and blood products if medically indicated.     Type and screen WAS sent.      Past Medical History:   Diagnosis Date    Axillary lymphadenopathy     Breast cancer 2019    right breast with lymph node metastasis, bilateral mastectomy with chemo&radiation    Monoallelic mutation of PALB2 gene     TLH-BS as she is on tamoxifen for breast cancer. Additionally, she is considering oophorectomy given small increase in ovarian cancer risk the in the setting of PALB 2 mutation.    Neuropathy     on/off as a result of radiation, was on Gabapentin, no longer needs since episodes are rare    Presence of (intrauterine) contraceptive device     Intrauterine device       Past Surgical History:   Procedure Laterality Date    BREAST  RECONSTRUCTION       SECTION, LOW TRANSVERSE  2016    CT ABDOMEN PELVIS ANGIOGRAM W AND/OR WO IV CONTRAST  2020    CT ABDOMEN PELVIS ANGIOGRAM W AND/OR WO IV CONTRAST 2020 [facility] ANCILL[facility]GAC[facility]    MASTECTOMY  2020    Mastectomy bilateral    OTHER SURGICAL HISTORY  2019    Tonsillectomy with adenoidectomy    TONSILLECTOMY  1992    Tubes in ears/tonsils out     Family History   Problem Relation Name Age of Onset    Breast cancer Mother      Hypothyroidism Mother      No Known Problems Father      Prostate cancer Maternal Grandfather         No Known Allergies    Social History     Tobacco Use   Smoking Status Never   Smokeless Tobacco Never     Social History     Substance and Sexual Activity   Alcohol Use Yes    Comment: occasional-rare, few times a month     Social History     Substance and Sexual Activity   Drug Use Never       Current Outpatient Medications   Medication Instructions    acetaminophen (TYLENOL 8 HOUR) 650 mg, Every 8 hours PRN    acyclovir (ZOVIRAX) 400 mg, oral, 2 times daily    anastrozole (ARIMIDEX) 1 mg, oral, Daily, Swallow whole with a drink of water.    cholecalciferol (VITAMIN D-3) 25 mcg, Daily    estradiol (Estrace) 0.01 % (0.1 mg/gram) vaginal cream Use nightly for one month, then decrease to 1-2 times/week. May use applicator or place dime-sized amount onto finger & spread over vaginal opening, applying small amount inside opening. Wash hands after use.    ibuprofen 400 mg, Every 6 hours PRN    lysine 500 mg tablet 1 tablet, Daily    magnesium 250 mg tablet Daily    multivitamin tablet 1 tablet, Daily    pyridoxine (VITAMIN B-6) 25 mg, Daily       PAT ROS:   Constitutional:   neg    Neuro/Psych:   neg    Eyes:    use of corrective lenses  Ears:   neg    Nose:   neg    Mouth:   neg    Throat:   neg    Neck:   neg    Cardio:   neg    Respiratory:   neg    Endocrine:   neg    GI:   neg    :   neg    Musculoskeletal:   neg    Hematologic:   neg   "  Skin:  neg        Physical Exam  Vitals reviewed. Physical exam within normal limits.          PAT AIRWAY:   Airway:     Mallampati::  II    Neck ROM::  Full  normal          Visit Vitals  /64   Pulse 71   Temp 35.9 °C (96.7 °F)   Resp 16   Ht 1.75 m (5' 8.9\")   Wt 89 kg (196 lb 3.4 oz)   SpO2 97%   BMI 29.06 kg/m²   OB Status Menopausal   Smoking Status Never   BSA 2.08 m²       Assessment and Plan:       Patient is a 35 year old female scheduled for laparoscopic total hysterectomy, bilateral salpingo-oophorectomy with Dr. Lorenzo on 7/11/25.      Plan    Neuro:    Significant anxiety in surgical environments- consider given anti-anxiety medication preoperatively.    Cardiovascular:    RCRI: 0 Risk of Mace: 0.9%    Caprini: 6  VTE risk: 1.9%    Patient denies any chest pain, tightness, heaviness, pressure, radiating pain, palpitations, irregular heartbeats, lightheadedness, cough, congestion, shortness of breath, MOREL, PND, near syncope, weight loss or gain.    Good functional capacity  Functional 4 Mets. Patient denies SOB walking up 2 flights of stairs      EKG in PAT not indicated.    Pulmonary:    Stop Bang score is 0 placing patient at low risk for FLORIAN  ARISCAT: <26 points, 1.6% risk of in-hospital postoperative pulmonary complication  PRODIGY: Low risk for opioid induced respiratory depression    Renal/endo:  Recommendations to avoid nephrotoxic drugs and carefully monitor fluid status to maintain euvolemia. Use dose adjusted medications as needed for the underlying level of renal function.    Heme:  Patient instructed to ambulate as soon as possible postoperatively to decrease thromboembolic risk.    Initiate mechanical DVT prophylaxis as soon as possible and initiate chemical prophylaxis when deemed safe from a bleeding standpoint post surgery.    History of breast cancer- s/p mastectomy with reconstruction and chemo. Follows with heme/onc. Will continue current immunotherapy.         Risk assessment " complete.  This patient is LOW risk candidate undergoing MODERATE risk procedure, patient is medically optimized for surgery.        Labs/testing obtained in PAT on 7/8/25: CBC, BMP, T&S    Lab Results   Component Value Date    WBC 5.7 07/08/2025    HGB 12.1 07/08/2025    HCT 36.4 07/08/2025    MCV 88 07/08/2025     07/08/2025     Lab Results   Component Value Date    GLUCOSE 86 07/08/2025    CALCIUM 9.3 07/08/2025     07/08/2025    K 4.6 07/08/2025    CO2 29 07/08/2025     07/08/2025    BUN 12 07/08/2025    CREATININE 0.86 07/08/2025         Follow up/communication: none      Preoperative medication instructions were provided and reviewed with the patient.  Any additional testing or evaluation was explained to the patient.  Nothing by mouth instructions were discussed and patient's questions were answered prior to conclusion to this encounter.  Patient verbalized understanding of preoperative instructions given in preadmission testing; discharge instructions available in EMR.    This note was dictated with speech recognition.  Minor errors may have been detected during use of speech recognition.    Charge not submitted as CPM/PAT visit within 72 hours of scheduled surgery.

## 2025-07-08 NOTE — CPM/PAT NURSE NOTE
CPM/PAT Nurse Note      Name: Nathaly Powell (Nathaly Powell)  /Age: 1990/35 y.o.       Medical History[1]    Surgical History[2]    Patient  reports being sexually active and has had partner(s) who are male. She reports using the following method of birth control/protection: I.U.D..    Family History[3]    Allergies[4]    Prior to Admission medications    Medication Sig Start Date End Date Taking? Authorizing Provider   acetaminophen (Tylenol 8 HOUR) 650 mg ER tablet Take 1 tablet (650 mg) by mouth every 8 hours if needed for mild pain (1 - 3). Do not crush, chew, or split.   Yes Historical Provider, MD   acyclovir (Zovirax) 400 mg tablet Take 1 tablet (400 mg) by mouth 2 times a day.  Patient taking differently: Take 1 tablet (400 mg) by mouth if needed. 25  Yes Orion Menjivar,    cholecalciferol (Vitamin D-3) 25 MCG (1000 UT) tablet Take 1 tablet (25 mcg) by mouth once daily.   Yes Historical Provider, MD   estradiol (Estrace) 0.01 % (0.1 mg/gram) vaginal cream Use nightly for one month, then decrease to 1-2 times/week. May use applicator or place dime-sized amount onto finger & spread over vaginal opening, applying small amount inside opening. Wash hands after use. 3/24/25  Yes Katherine Lorenzo MD   ibuprofen 400 mg tablet Take 1 tablet (400 mg) by mouth every 6 hours if needed for moderate pain (4 - 6).   Yes Historical Provider, MD   lysine 500 mg tablet Take 1 tablet (500 mg) by mouth once daily. 20  Yes Historical Provider, MD   magnesium 250 mg tablet Take by mouth early in the morning..   Yes Historical Provider, MD   multivitamin tablet Take 1 tablet by mouth once daily.   Yes Historical Provider, MD   pyridoxine (Vitamin B-6) 25 mg tablet Take 1 tablet (25 mg) by mouth once daily.   Yes Historical Provider, MD   anastrozole (Arimidex) 1 mg tablet Take 1 tablet (1 mg total) by mouth once daily.  Swallow whole with a drink of water.  Patient not taking: Reported on 2025  9/30/25  OZ Gonzalez   tamoxifen (Nolvadex) 20 mg tablet Take 1 tablet (20 mg total) by mouth once daily.  Take with water or any other nonalcoholic drink with or without food at around the same time(s) every day. 4/22/25 7/2/25  OZ Gonzalez        PAT ROS     DASI Risk Score      Flowsheet Row Questionnaire Series Submission from 3/27/2025 in [facility] with Generic Provider Lenny   Can you take care of yourself (eat, dress, bathe, or use toilet)?  2.75  filed at 03/27/2025 1315   Can you walk indoors, such as around your house? 1.75  filed at 03/27/2025 1315   Can you walk a block or two on level ground?  2.75  filed at 03/27/2025 1315   Can you climb a flight of stairs or walk up a hill? 5.5  filed at 03/27/2025 1315   Can you run a short distance? 8  filed at 03/27/2025 1315   Can you do light work around the house like dusting or washing dishes? 2.7  filed at 03/27/2025 1315   Can you do moderate work around the house like vacuuming, sweeping floors or carrying groceries? 3.5  filed at 03/27/2025 1315   Can you do heavy work around the house like scrubbing floors or lifting and moving heavy furniture?  8  filed at 03/27/2025 1315   Can you do yard work like raking leaves, weeding or pushing a mower? 4.5  filed at 03/27/2025 1315   Can you have sexual relations? 5.25  filed at 03/27/2025 1315   Can you participate in moderate recreational activities like golf, bowling, dancing, doubles tennis or throwing a baseball or football? 6  filed at 03/27/2025 1315   Can you participate in strenous sports like swimming, singles tennis, football, basketball, or skiing? 7.5  filed at 03/27/2025 1315   DASI SCORE 58.2  filed at 03/27/2025 1315   METS Score (Will be calculated only when all the questions are answered) 9.9  filed at 03/27/2025 9289          [facility]    No data to display       Modified Frailty Index    No data to display       [id number] Stroke Risk Points   Current as of just now        N/A 0 to 9 Points:      Last Change: N/A          The [id number]-XUHh risk score (Tammi ESPINOSA, et al. 2009. © 2010 American College [employer]) quantifies the risk of stroke for a patient with atrial fibrillation. For patients without atrial fibrillation or under the age of 18 this score appears as N/A. Higher score values generally indicate higher risk of stroke.        This score is not applicable to this patient. Components are not calculated.          Revised Cardiac Risk Index    No data to display       Apfel Simplified Score    No data to display       Risk Analysis Index Results This Encounter    No data found in the last 10 encounters.       Stop Bang Score      Flowsheet Row Questionnaire Series Submission from 3/27/2025 in [facility] with Generic Provider Lenny   Do you snore loudly? 0  filed at 03/27/2025 1315   Do you often feel tired or fatigued after your sleep? 0  filed at 03/27/2025 1315   Has anyone ever observed you stop breathing in your sleep? 0  filed at 03/27/2025 1315   Do you have or are you being treated for high blood pressure? 0  filed at 03/27/2025 1315   Recent BMI (Calculated) 28.1  filed at 03/27/2025 1315   Is BMI greater than 35 kg/m2? 0=No  filed at 03/27/2025 1315   Age older than 50 years old? 0=No  filed at 03/27/2025 1315   Gender - Male 0=No  filed at 03/27/2025 1315          Prodigy: High Risk  Total Score: 0          ARISCAT Score for Postoperative Pulmonary Complications    No data to display       Vicne Perioperative Risk for Myocardial Infarction or Cardiac Arrest (ANN-MARIE)    No data to display         Nurse Plan of Action:       After Visit Summary (AVS) reviewed and patient verbalized good understanding of medications and NPO instructions.            [1]   Past Medical History:  Diagnosis Date    Axillary lymphadenopathy     Breast cancer 2019    right breast with lymph node metastasis, bilateral mastectomy with chemo&radiation     Monoallelic mutation of PALB2 gene     TLH-BS as she is on tamoxifen for breast cancer. Additionally, she is considering oophorectomy given small increase in ovarian cancer risk the in the setting of PALB 2 mutation.    Neuropathy     on/off as a result of radiation, was on Gabapentin, no longer needs since episodes are rare    Presence of (intrauterine) contraceptive device     Intrauterine device   [2]   Past Surgical History:  Procedure Laterality Date    BREAST RECONSTRUCTION       SECTION, LOW TRANSVERSE  2016    CT ABDOMEN PELVIS ANGIOGRAM W AND/OR WO IV CONTRAST  2020    CT ABDOMEN PELVIS ANGIOGRAM W AND/OR WO IV CONTRAST 2020 [facility]    MASTECTOMY  2020    Mastectomy bilateral    OTHER SURGICAL HISTORY  2019    Tonsillectomy with adenoidectomy    TONSILLECTOMY      Tubes in ears/tonsils out   [3]   Family History  Problem Relation Name Age of Onset    Breast cancer Mother      Hypothyroidism Mother      No Known Problems Father      Prostate cancer Maternal Grandfather     [4] No Known Allergies

## 2025-07-10 ENCOUNTER — ANESTHESIA EVENT (OUTPATIENT)
Dept: OPERATING ROOM | Facility: HOSPITAL | Age: 35
End: 2025-07-10
Payer: COMMERCIAL

## 2025-07-11 ENCOUNTER — HOSPITAL ENCOUNTER (OUTPATIENT)
Facility: HOSPITAL | Age: 35
Setting detail: OUTPATIENT SURGERY
Discharge: HOME | End: 2025-07-11
Attending: STUDENT IN AN ORGANIZED HEALTH CARE EDUCATION/TRAINING PROGRAM | Admitting: STUDENT IN AN ORGANIZED HEALTH CARE EDUCATION/TRAINING PROGRAM
Payer: COMMERCIAL

## 2025-07-11 ENCOUNTER — ANESTHESIA (OUTPATIENT)
Dept: OPERATING ROOM | Facility: HOSPITAL | Age: 35
End: 2025-07-11
Payer: COMMERCIAL

## 2025-07-11 ENCOUNTER — PHARMACY VISIT (OUTPATIENT)
Dept: PHARMACY | Facility: CLINIC | Age: 35
End: 2025-07-11
Payer: COMMERCIAL

## 2025-07-11 VITALS
HEART RATE: 65 BPM | DIASTOLIC BLOOD PRESSURE: 80 MMHG | HEIGHT: 68 IN | TEMPERATURE: 97.2 F | BODY MASS INDEX: 29.64 KG/M2 | OXYGEN SATURATION: 96 % | RESPIRATION RATE: 15 BRPM | WEIGHT: 195.55 LBS | SYSTOLIC BLOOD PRESSURE: 117 MMHG

## 2025-07-11 DIAGNOSIS — Z15.89 PALB2 GENE MUTATION POSITIVE: ICD-10-CM

## 2025-07-11 DIAGNOSIS — Z79.810 LONG-TERM CURRENT USE OF TAMOXIFEN: ICD-10-CM

## 2025-07-11 DIAGNOSIS — Z98.890 POST-OPERATIVE STATE: ICD-10-CM

## 2025-07-11 DIAGNOSIS — Z71.89 SURGICAL COUNSELING VISIT: Primary | ICD-10-CM

## 2025-07-11 LAB
ABO GROUP (TYPE) IN BLOOD: NORMAL
PREGNANCY TEST URINE, POC: NEGATIVE
RH FACTOR (ANTIGEN D): NORMAL

## 2025-07-11 PROCEDURE — 3700000002 HC GENERAL ANESTHESIA TIME - EACH INCREMENTAL 1 MINUTE: Performed by: STUDENT IN AN ORGANIZED HEALTH CARE EDUCATION/TRAINING PROGRAM

## 2025-07-11 PROCEDURE — 3600000004 HC OR TIME - INITIAL BASE CHARGE - PROCEDURE LEVEL FOUR: Performed by: STUDENT IN AN ORGANIZED HEALTH CARE EDUCATION/TRAINING PROGRAM

## 2025-07-11 PROCEDURE — 2720000007 HC OR 272 NO HCPCS: Performed by: STUDENT IN AN ORGANIZED HEALTH CARE EDUCATION/TRAINING PROGRAM

## 2025-07-11 PROCEDURE — 2500000004 HC RX 250 GENERAL PHARMACY W/ HCPCS (ALT 636 FOR OP/ED): Performed by: ANESTHESIOLOGY

## 2025-07-11 PROCEDURE — 2500000004 HC RX 250 GENERAL PHARMACY W/ HCPCS (ALT 636 FOR OP/ED): Performed by: ANESTHESIOLOGIST ASSISTANT

## 2025-07-11 PROCEDURE — 7100000009 HC PHASE TWO TIME - INITIAL BASE CHARGE: Performed by: STUDENT IN AN ORGANIZED HEALTH CARE EDUCATION/TRAINING PROGRAM

## 2025-07-11 PROCEDURE — 3600000009 HC OR TIME - EACH INCREMENTAL 1 MINUTE - PROCEDURE LEVEL FOUR: Performed by: STUDENT IN AN ORGANIZED HEALTH CARE EDUCATION/TRAINING PROGRAM

## 2025-07-11 PROCEDURE — RXMED WILLOW AMBULATORY MEDICATION CHARGE

## 2025-07-11 PROCEDURE — 2780000003 HC OR 278 NO HCPCS: Performed by: STUDENT IN AN ORGANIZED HEALTH CARE EDUCATION/TRAINING PROGRAM

## 2025-07-11 PROCEDURE — 96372 THER/PROPH/DIAG INJ SC/IM: CPT | Mod: 59 | Performed by: STUDENT IN AN ORGANIZED HEALTH CARE EDUCATION/TRAINING PROGRAM

## 2025-07-11 PROCEDURE — 2500000001 HC RX 250 WO HCPCS SELF ADMINISTERED DRUGS (ALT 637 FOR MEDICARE OP): Performed by: STUDENT IN AN ORGANIZED HEALTH CARE EDUCATION/TRAINING PROGRAM

## 2025-07-11 PROCEDURE — 2500000005 HC RX 250 GENERAL PHARMACY W/O HCPCS: Performed by: STUDENT IN AN ORGANIZED HEALTH CARE EDUCATION/TRAINING PROGRAM

## 2025-07-11 PROCEDURE — 7100000002 HC RECOVERY ROOM TIME - EACH INCREMENTAL 1 MINUTE: Performed by: STUDENT IN AN ORGANIZED HEALTH CARE EDUCATION/TRAINING PROGRAM

## 2025-07-11 PROCEDURE — 81025 URINE PREGNANCY TEST: CPT | Performed by: STUDENT IN AN ORGANIZED HEALTH CARE EDUCATION/TRAINING PROGRAM

## 2025-07-11 PROCEDURE — 2500000001 HC RX 250 WO HCPCS SELF ADMINISTERED DRUGS (ALT 637 FOR MEDICARE OP): Performed by: ANESTHESIOLOGY

## 2025-07-11 PROCEDURE — 58571 TLH W/T/O 250 G OR LESS: CPT | Performed by: STUDENT IN AN ORGANIZED HEALTH CARE EDUCATION/TRAINING PROGRAM

## 2025-07-11 PROCEDURE — 3700000001 HC GENERAL ANESTHESIA TIME - INITIAL BASE CHARGE: Performed by: STUDENT IN AN ORGANIZED HEALTH CARE EDUCATION/TRAINING PROGRAM

## 2025-07-11 PROCEDURE — 88309 TISSUE EXAM BY PATHOLOGIST: CPT | Performed by: STUDENT IN AN ORGANIZED HEALTH CARE EDUCATION/TRAINING PROGRAM

## 2025-07-11 PROCEDURE — 7100000001 HC RECOVERY ROOM TIME - INITIAL BASE CHARGE: Performed by: STUDENT IN AN ORGANIZED HEALTH CARE EDUCATION/TRAINING PROGRAM

## 2025-07-11 PROCEDURE — 2500000004 HC RX 250 GENERAL PHARMACY W/ HCPCS (ALT 636 FOR OP/ED): Performed by: STUDENT IN AN ORGANIZED HEALTH CARE EDUCATION/TRAINING PROGRAM

## 2025-07-11 PROCEDURE — 7100000010 HC PHASE TWO TIME - EACH INCREMENTAL 1 MINUTE: Performed by: STUDENT IN AN ORGANIZED HEALTH CARE EDUCATION/TRAINING PROGRAM

## 2025-07-11 PROCEDURE — 88309 TISSUE EXAM BY PATHOLOGIST: CPT | Mod: TC,AHULAB | Performed by: STUDENT IN AN ORGANIZED HEALTH CARE EDUCATION/TRAINING PROGRAM

## 2025-07-11 PROCEDURE — C1889 IMPLANT/INSERT DEVICE, NOC: HCPCS | Performed by: STUDENT IN AN ORGANIZED HEALTH CARE EDUCATION/TRAINING PROGRAM

## 2025-07-11 PROCEDURE — 36415 COLL VENOUS BLD VENIPUNCTURE: CPT | Performed by: ANESTHESIOLOGY

## 2025-07-11 RX ORDER — ROCURONIUM BROMIDE 10 MG/ML
INJECTION, SOLUTION INTRAVENOUS AS NEEDED
Status: DISCONTINUED | OUTPATIENT
Start: 2025-07-11 | End: 2025-07-11

## 2025-07-11 RX ORDER — ACETAMINOPHEN 325 MG/1
650 TABLET ORAL EVERY 6 HOURS PRN
Qty: 20 TABLET | Refills: 0 | Status: SHIPPED | OUTPATIENT
Start: 2025-07-11 | End: 2025-07-24 | Stop reason: WASHOUT

## 2025-07-11 RX ORDER — ONDANSETRON HYDROCHLORIDE 2 MG/ML
4 INJECTION, SOLUTION INTRAVENOUS ONCE AS NEEDED
Status: COMPLETED | OUTPATIENT
Start: 2025-07-11 | End: 2025-07-11

## 2025-07-11 RX ORDER — LIDOCAINE 50 MG/G
1 PATCH TOPICAL DAILY
Qty: 30 PATCH | Refills: 0 | Status: SHIPPED | OUTPATIENT
Start: 2025-07-11

## 2025-07-11 RX ORDER — ACETAMINOPHEN 325 MG/1
975 TABLET ORAL ONCE
Status: COMPLETED | OUTPATIENT
Start: 2025-07-11 | End: 2025-07-11

## 2025-07-11 RX ORDER — AMOXICILLIN 250 MG
1 CAPSULE ORAL 2 TIMES DAILY
Qty: 30 TABLET | Refills: 0 | Status: SHIPPED | OUTPATIENT
Start: 2025-07-11

## 2025-07-11 RX ORDER — ONDANSETRON HYDROCHLORIDE 2 MG/ML
INJECTION, SOLUTION INTRAVENOUS AS NEEDED
Status: DISCONTINUED | OUTPATIENT
Start: 2025-07-11 | End: 2025-07-11

## 2025-07-11 RX ORDER — LIDOCAINE HYDROCHLORIDE 10 MG/ML
0.1 INJECTION, SOLUTION EPIDURAL; INFILTRATION; INTRACAUDAL; PERINEURAL ONCE
Status: DISCONTINUED | OUTPATIENT
Start: 2025-07-11 | End: 2025-07-11 | Stop reason: HOSPADM

## 2025-07-11 RX ORDER — HYDROMORPHONE HYDROCHLORIDE 1 MG/ML
INJECTION, SOLUTION INTRAMUSCULAR; INTRAVENOUS; SUBCUTANEOUS AS NEEDED
Status: DISCONTINUED | OUTPATIENT
Start: 2025-07-11 | End: 2025-07-11

## 2025-07-11 RX ORDER — OXYCODONE HYDROCHLORIDE 5 MG/1
5 TABLET ORAL EVERY 6 HOURS PRN
Qty: 12 TABLET | Refills: 0 | Status: SHIPPED | OUTPATIENT
Start: 2025-07-11 | End: 2025-07-24 | Stop reason: WASHOUT

## 2025-07-11 RX ORDER — IPRATROPIUM BROMIDE 0.5 MG/2.5ML
500 SOLUTION RESPIRATORY (INHALATION) ONCE
Status: DISCONTINUED | OUTPATIENT
Start: 2025-07-11 | End: 2025-07-11 | Stop reason: HOSPADM

## 2025-07-11 RX ORDER — ACETAMINOPHEN 325 MG/1
650 TABLET ORAL EVERY 4 HOURS PRN
Status: DISCONTINUED | OUTPATIENT
Start: 2025-07-11 | End: 2025-07-11 | Stop reason: HOSPADM

## 2025-07-11 RX ORDER — OXYCODONE HYDROCHLORIDE 5 MG/1
5 TABLET ORAL EVERY 4 HOURS PRN
Status: DISCONTINUED | OUTPATIENT
Start: 2025-07-11 | End: 2025-07-11 | Stop reason: HOSPADM

## 2025-07-11 RX ORDER — PROPOFOL 10 MG/ML
INJECTION, EMULSION INTRAVENOUS AS NEEDED
Status: DISCONTINUED | OUTPATIENT
Start: 2025-07-11 | End: 2025-07-11

## 2025-07-11 RX ORDER — HEPARIN SODIUM 5000 [USP'U]/ML
5000 INJECTION, SOLUTION INTRAVENOUS; SUBCUTANEOUS ONCE
Status: COMPLETED | OUTPATIENT
Start: 2025-07-11 | End: 2025-07-11

## 2025-07-11 RX ORDER — DROPERIDOL 2.5 MG/ML
0.62 INJECTION, SOLUTION INTRAMUSCULAR; INTRAVENOUS ONCE AS NEEDED
Status: DISCONTINUED | OUTPATIENT
Start: 2025-07-11 | End: 2025-07-11 | Stop reason: HOSPADM

## 2025-07-11 RX ORDER — BUPIVACAINE HYDROCHLORIDE 5 MG/ML
INJECTION, SOLUTION PERINEURAL AS NEEDED
Status: DISCONTINUED | OUTPATIENT
Start: 2025-07-11 | End: 2025-07-11 | Stop reason: HOSPADM

## 2025-07-11 RX ORDER — FENTANYL CITRATE 50 UG/ML
INJECTION, SOLUTION INTRAMUSCULAR; INTRAVENOUS AS NEEDED
Status: DISCONTINUED | OUTPATIENT
Start: 2025-07-11 | End: 2025-07-11

## 2025-07-11 RX ORDER — SODIUM CHLORIDE, SODIUM LACTATE, POTASSIUM CHLORIDE, CALCIUM CHLORIDE 600; 310; 30; 20 MG/100ML; MG/100ML; MG/100ML; MG/100ML
INJECTION, SOLUTION INTRAVENOUS CONTINUOUS PRN
Status: DISCONTINUED | OUTPATIENT
Start: 2025-07-11 | End: 2025-07-11

## 2025-07-11 RX ORDER — ALBUTEROL SULFATE 0.83 MG/ML
2.5 SOLUTION RESPIRATORY (INHALATION) ONCE AS NEEDED
Status: DISCONTINUED | OUTPATIENT
Start: 2025-07-11 | End: 2025-07-11 | Stop reason: HOSPADM

## 2025-07-11 RX ORDER — PHENAZOPYRIDINE HYDROCHLORIDE 100 MG/1
200 TABLET, FILM COATED ORAL ONCE
Status: COMPLETED | OUTPATIENT
Start: 2025-07-11 | End: 2025-07-11

## 2025-07-11 RX ORDER — CEFAZOLIN 1 G/1
INJECTION, POWDER, FOR SOLUTION INTRAVENOUS AS NEEDED
Status: DISCONTINUED | OUTPATIENT
Start: 2025-07-11 | End: 2025-07-11

## 2025-07-11 RX ORDER — LIDOCAINE HYDROCHLORIDE 20 MG/ML
INJECTION, SOLUTION EPIDURAL; INFILTRATION; INTRACAUDAL; PERINEURAL AS NEEDED
Status: DISCONTINUED | OUTPATIENT
Start: 2025-07-11 | End: 2025-07-11

## 2025-07-11 RX ORDER — MIDAZOLAM HYDROCHLORIDE 1 MG/ML
INJECTION INTRAMUSCULAR; INTRAVENOUS AS NEEDED
Status: DISCONTINUED | OUTPATIENT
Start: 2025-07-11 | End: 2025-07-11

## 2025-07-11 RX ORDER — IBUPROFEN 600 MG/1
600 TABLET, FILM COATED ORAL EVERY 6 HOURS PRN
Qty: 20 TABLET | Refills: 0 | Status: SHIPPED | OUTPATIENT
Start: 2025-07-11 | End: 2025-07-24 | Stop reason: WASHOUT

## 2025-07-11 RX ORDER — GABAPENTIN 300 MG/1
600 CAPSULE ORAL ONCE
Status: COMPLETED | OUTPATIENT
Start: 2025-07-11 | End: 2025-07-11

## 2025-07-11 RX ORDER — SODIUM CHLORIDE 0.9 G/100ML
INJECTION, SOLUTION IRRIGATION AS NEEDED
Status: DISCONTINUED | OUTPATIENT
Start: 2025-07-11 | End: 2025-07-11 | Stop reason: HOSPADM

## 2025-07-11 RX ORDER — METRONIDAZOLE 500 MG/100ML
INJECTION, SOLUTION INTRAVENOUS AS NEEDED
Status: DISCONTINUED | OUTPATIENT
Start: 2025-07-11 | End: 2025-07-11

## 2025-07-11 RX ORDER — SODIUM CHLORIDE, SODIUM LACTATE, POTASSIUM CHLORIDE, CALCIUM CHLORIDE 600; 310; 30; 20 MG/100ML; MG/100ML; MG/100ML; MG/100ML
100 INJECTION, SOLUTION INTRAVENOUS CONTINUOUS
Status: DISCONTINUED | OUTPATIENT
Start: 2025-07-11 | End: 2025-07-11 | Stop reason: HOSPADM

## 2025-07-11 RX ORDER — CELECOXIB 200 MG/1
400 CAPSULE ORAL ONCE
Status: COMPLETED | OUTPATIENT
Start: 2025-07-11 | End: 2025-07-11

## 2025-07-11 RX ORDER — ONDANSETRON 4 MG/1
4 TABLET, FILM COATED ORAL EVERY 6 HOURS PRN
Qty: 20 TABLET | Refills: 0 | Status: SHIPPED | OUTPATIENT
Start: 2025-07-11 | End: 2025-07-24 | Stop reason: WASHOUT

## 2025-07-11 RX ADMIN — CEFAZOLIN 2 G: 1 INJECTION, POWDER, FOR SOLUTION INTRAMUSCULAR; INTRAVENOUS at 07:45

## 2025-07-11 RX ADMIN — LIDOCAINE HYDROCHLORIDE 100 MG: 20 INJECTION, SOLUTION EPIDURAL; INFILTRATION; INTRACAUDAL; PERINEURAL at 07:44

## 2025-07-11 RX ADMIN — HYDROMORPHONE HYDROCHLORIDE 0.5 MG: 1 INJECTION, SOLUTION INTRAMUSCULAR; INTRAVENOUS; SUBCUTANEOUS at 11:52

## 2025-07-11 RX ADMIN — OXYCODONE HYDROCHLORIDE 5 MG: 5 TABLET ORAL at 11:11

## 2025-07-11 RX ADMIN — ROCURONIUM BROMIDE 20 MG: 10 INJECTION, SOLUTION INTRAVENOUS at 09:15

## 2025-07-11 RX ADMIN — CELECOXIB 400 MG: 200 CAPSULE ORAL at 06:48

## 2025-07-11 RX ADMIN — HYDROMORPHONE HYDROCHLORIDE 0.2 MG: 1 INJECTION, SOLUTION INTRAMUSCULAR; INTRAVENOUS; SUBCUTANEOUS at 10:24

## 2025-07-11 RX ADMIN — FENTANYL CITRATE 50 MCG: 50 INJECTION, SOLUTION INTRAMUSCULAR; INTRAVENOUS at 07:44

## 2025-07-11 RX ADMIN — HYDROMORPHONE HYDROCHLORIDE 0.5 MG: 1 INJECTION, SOLUTION INTRAMUSCULAR; INTRAVENOUS; SUBCUTANEOUS at 11:45

## 2025-07-11 RX ADMIN — SUGAMMADEX 200 MG: 100 INJECTION, SOLUTION INTRAVENOUS at 10:41

## 2025-07-11 RX ADMIN — PROPOFOL 200 MG: 10 INJECTION, EMULSION INTRAVENOUS at 07:44

## 2025-07-11 RX ADMIN — PHENAZOPYRIDINE 200 MG: 100 TABLET ORAL at 06:48

## 2025-07-11 RX ADMIN — GABAPENTIN 600 MG: 300 CAPSULE ORAL at 06:48

## 2025-07-11 RX ADMIN — ROCURONIUM BROMIDE 20 MG: 10 INJECTION, SOLUTION INTRAVENOUS at 08:26

## 2025-07-11 RX ADMIN — ONDANSETRON 4 MG: 2 INJECTION, SOLUTION INTRAMUSCULAR; INTRAVENOUS at 11:29

## 2025-07-11 RX ADMIN — ONDANSETRON 4 MG: 2 INJECTION, SOLUTION INTRAMUSCULAR; INTRAVENOUS at 10:06

## 2025-07-11 RX ADMIN — MIDAZOLAM HYDROCHLORIDE 2 MG: 1 INJECTION, SOLUTION INTRAMUSCULAR; INTRAVENOUS at 07:39

## 2025-07-11 RX ADMIN — SUGAMMADEX 200 MG: 100 INJECTION, SOLUTION INTRAVENOUS at 10:13

## 2025-07-11 RX ADMIN — METRONIDAZOLE 500 MG: 500 INJECTION, SOLUTION INTRAVENOUS at 07:49

## 2025-07-11 RX ADMIN — FENTANYL CITRATE 50 MCG: 50 INJECTION, SOLUTION INTRAMUSCULAR; INTRAVENOUS at 10:06

## 2025-07-11 RX ADMIN — HYDROMORPHONE HYDROCHLORIDE 0.5 MG: 1 INJECTION, SOLUTION INTRAMUSCULAR; INTRAVENOUS; SUBCUTANEOUS at 11:29

## 2025-07-11 RX ADMIN — DEXAMETHASONE SODIUM PHOSPHATE 4 MG: 4 INJECTION, SOLUTION INTRAMUSCULAR; INTRAVENOUS at 09:18

## 2025-07-11 RX ADMIN — SODIUM CHLORIDE, SODIUM LACTATE, POTASSIUM CHLORIDE, AND CALCIUM CHLORIDE: .6; .31; .03; .02 INJECTION, SOLUTION INTRAVENOUS at 07:39

## 2025-07-11 RX ADMIN — ROCURONIUM BROMIDE 50 MG: 10 INJECTION, SOLUTION INTRAVENOUS at 07:45

## 2025-07-11 RX ADMIN — HEPARIN SODIUM 5000 UNITS: 5000 INJECTION, SOLUTION INTRAVENOUS; SUBCUTANEOUS at 06:48

## 2025-07-11 RX ADMIN — ACETAMINOPHEN 975 MG: 325 TABLET ORAL at 06:48

## 2025-07-11 RX ADMIN — HYDROMORPHONE HYDROCHLORIDE 0.5 MG: 1 INJECTION, SOLUTION INTRAMUSCULAR; INTRAVENOUS; SUBCUTANEOUS at 11:03

## 2025-07-11 ASSESSMENT — PAIN - FUNCTIONAL ASSESSMENT
PAIN_FUNCTIONAL_ASSESSMENT: 0-10

## 2025-07-11 ASSESSMENT — PAIN DESCRIPTION - DESCRIPTORS
DESCRIPTORS: CRAMPING
DESCRIPTORS: ACHING
DESCRIPTORS: CRAMPING
DESCRIPTORS: SHARP
DESCRIPTORS: ACHING
DESCRIPTORS: SHARP

## 2025-07-11 ASSESSMENT — PAIN DESCRIPTION - ORIENTATION
ORIENTATION: MID;LOWER
ORIENTATION: LOWER;MID
ORIENTATION: MID;LOWER

## 2025-07-11 ASSESSMENT — COLUMBIA-SUICIDE SEVERITY RATING SCALE - C-SSRS
1. IN THE PAST MONTH, HAVE YOU WISHED YOU WERE DEAD OR WISHED YOU COULD GO TO SLEEP AND NOT WAKE UP?: NO
6. HAVE YOU EVER DONE ANYTHING, STARTED TO DO ANYTHING, OR PREPARED TO DO ANYTHING TO END YOUR LIFE?: NO
2. HAVE YOU ACTUALLY HAD ANY THOUGHTS OF KILLING YOURSELF?: NO

## 2025-07-11 ASSESSMENT — PAIN SCALES - GENERAL
PAINLEVEL_OUTOF10: 0 - NO PAIN
PAINLEVEL_OUTOF10: 5 - MODERATE PAIN
PAINLEVEL_OUTOF10: 6
PAINLEVEL_OUTOF10: 7
PAINLEVEL_OUTOF10: 4
PAINLEVEL_OUTOF10: 7
PAINLEVEL_OUTOF10: 4
PAINLEVEL_OUTOF10: 0 - NO PAIN

## 2025-07-11 ASSESSMENT — PAIN DESCRIPTION - LOCATION
LOCATION: ABDOMEN

## 2025-07-11 NOTE — INTERVAL H&P NOTE
"Gynecologic Surgery Interval History and Physical     Nathaly Powell is a 35 y.o. admitted for laparoscopic TLH, BSO given breast CA history and PALB 2 genetic mutation.     Imaging:   - Pelvic US 2/2024 showed uterus measuring 3cm x 3cm x 7cm. Bilateral ovaries not evaluated.   - Repeat US recently ordered by Dr. 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, ER+, MN negative, HER2 negative;  PALB2 mutation  PSHx: bilateral mastectomy, bilateral MS-TRAM reconstruction, tonsils/adenoids, CS x 1    Medical History[1]     Surgical History[2]       /69   Pulse 68   Temp 36 °C (96.8 °F) (Temporal)   Resp 18   Ht 1.727 m (5' 8\")   Wt 88.7 kg (195 lb 8.8 oz)   SpO2 98%   BMI 29.73 kg/m²      General: no acute distress  HEENT: normocephalic, atraumatic  CV: warm and well perfused  Lungs: breathing comfortably on room air  Abdomen: NTND  Extremities: moving all extremities spontaneously  Neuro: awake and conversant  Psych: appropriate mood and affect      Assessment & Plan     Will proceed with scheduled surgery. Consents to be signed prior to OR.     Seen & dw. GALI Wan. Daniella Holly MD  PGY-3, Obstetrics & Gynecology   [facility]         [1]   Past Medical History:  Diagnosis Date    Axillary lymphadenopathy     Breast cancer 2019    right breast with lymph node metastasis, bilateral mastectomy with chemo&radiation    Monoallelic mutation of PALB2 gene     TLH-BS as she is on tamoxifen for breast cancer. Additionally, she is considering oophorectomy given small increase in ovarian cancer risk the in the setting of PALB 2 mutation.    Neuropathy     on/off as a result of radiation, was on Gabapentin, no longer needs since episodes are rare    Presence of (intrauterine) " contraceptive device     Intrauterine device   [2]   Past Surgical History:  Procedure Laterality Date    BREAST RECONSTRUCTION       SECTION, LOW TRANSVERSE  2016    CT ABDOMEN PELVIS ANGIOGRAM W AND/OR WO IV CONTRAST  2020    CT ABDOMEN PELVIS ANGIOGRAM W AND/OR WO IV CONTRAST 2020 [facility]    MASTECTOMY  2020    Mastectomy bilateral    OTHER SURGICAL HISTORY  2019    Tonsillectomy with adenoidectomy    TONSILLECTOMY  1992    Tubes in ears/tonsils out

## 2025-07-11 NOTE — DISCHARGE INSTRUCTIONS
Laparoscopic Discharge Instructions  If you have any questions about your care, please contact us at [phone].    Medications and Pain Management  Common areas of pain after laparoscopic surgery include the incision pain, pain in between your shoulder blades, the pelvis and lower back. The gas that was used to distend your abdomen for the surgery is absorbed slowly into your blood stream over the first 3-4 days after surgery. It is not passed intestinally, although, because your abdomen is distended, it may feel similar to intestinal gas. Staying active and walking is the best way to promote the absorption of this gas.  Immediately after surgery, nerve pain is the most intense, typically for the first 6 to 12 hours. As the body heals, it creates inflammation around the incisions sites adding pressure and creating soreness. After 5 days, the inflammation begins to recede and significant improvement in soreness is expected. Pulling on the incisions, especially if sudden, such as when you cough, will reactivate the nerve pain. Support your abdomen with a pillow during coughing or sneezing as this will be helpful to minimize pain. There are two types of pain pills typically used for post-operative pain management, narcotics such as tramadol and an anti-inflammatory such as Ibuprofen or Naprosyn.  Taking regular anti-inflammatory pills, such as 600mg of Ibuprofen every 6 hours for the first 5 days and then as needed is recommended. You can alternate ibuprofen with tylenol (975mg or 1000mg). The tylenol can be taken every 6 hours.  If you have problems using NSAIDs, be sure to discuss this with your doctor. The narcotic can be used on a schedule for the first 1 to 2 days but after that, only as you need it. Narcotics can cause constipation, nausea, sleepiness and headaches. You may begin your usual home medications as you were taking before unless directed by your doctor.    Incisional care  Paper tape  steri-strips are typically used for the abdominal incisions. The steri-strips will fall off on their own or can be removed after one week, this is easiest to do in the shower. You may shower and use a mild soap around the incisions and pat dry. Do not use a washcloth or scrub the incisions. Using peroxide or antiseptics is not recommended for routine care. Avoid hot and steamy showers as this may cause you to feel faint. No tub baths for six weeks following surgery. There may be discoloration or bruising around the incisions. This is normal and may take several weeks to resolve. Firmness or a nodular area under the skin near the incision may represent a collection of blood, this too will resolve on its own after a little time. If any incision develops tenderness, redness in the skin layer or has drainage please call the office.    GI Function, Nausea and Constipation  Nausea can occasionally be an issue in the first few days after surgery. It is usually caused as a side effect from the anesthesia and pain medicine, particularly narcotics. Taking the pain pills with food is a food way to proactively minimize this. Throwing up, especially after the first day, is not expected and if this happens, you should call your doctor. Feeling gassy and constipation can be a problem for the first week after surgery. Limiting the use of narcotics may be helpful. Stool softeners twice a day and a high fiber diet are safe. If needed, Miralax once daily is a good choice. If no bowel movement after 3 days, you will need to increase the Miralax until soft, regular bowel movements are passing.    Urinating  Because the bladder is disturbed by the surgery, the normal sensation may be temporarily altered. You may not be aware that your bladder is full. If the bladder is allowed to get over distended, it may make the problem worse. This is why we make sure that you are able to empty your bladder adequately before you go home. For the first  few days at home, you should make a point to empty your bladder every 3 to 4 hours. Pain with voiding, especially after the first day, is not expected and may represent a bladder infection.    Activity  For the first two days post-operatively, your soreness and recovery from anesthesia will limit your activity  Stairs are safe, just take your time  At a minimum during this time, you should walk around for 10-15 minutes every 2-3 hours. After that, in the first week, any activity except for overt exercise is safe.   During the first week you should not commit to being on your feet for more than 30 minutes at a time.   During the second week, light exercise is encouraged.  After 2 weeks from surgery, you should try to get back into regular activity.   For healing, please limit the amount of weight lifted to 8-10 pounds (a gallon of milk) for the first 2 weeks after surgery.   Driving is usually okay after the first few days. You must be able to comfortably wear a seatbelt, press the gas/brake pedals, and drive defensively. You may not drive while taking narcotic pain medicines.    When to Call the Doctor  Call for any fever above 100.4 F (If you do not feel feverish you do not have to routinely check your temperature.)  Call for severe pain not improved by medications  Call for persistent nausea, vomiting  Call for vaginal bleeding that is heavy as a period or passing blood clots larger than a quarter  Call for unusual swelling in your legs  Call if the incisions develop painful redness and discharge    In an emergency, call 911 or go to an Emergency Department at a nearby hospital

## 2025-07-11 NOTE — ANESTHESIA PREPROCEDURE EVALUATION
Patient: Nathaly Powell    Procedure Information       Date/Time: 07/11/25 0715    Procedure: Laparoscopic Total Hysterectomy; Bilateral Salpingo Oophorectomy (Bilateral: Abdomen)    Location: [facility] A OR 11 / Virtual [facility] A OR    Surgeons: Katherine Lorenzo MD            Relevant Problems   GI   (+) GERD (gastroesophageal reflux disease)      ID   (+) Acute viral bronchitis   (+) HSV-1 infection      GYN   (+) Abnormal uterine bleeding   (+) PALB2-related breast cancer in female       Clinical information reviewed:   Tobacco  Allergies  Meds   Med Hx  Surg Hx  OB Status  Fam Hx  Soc   Hx        NPO Detail:  NPO/Void Status  Carbohydrate Drink Given Prior to Surgery? : N  Date of Last Liquid: 07/10/25  Time of Last Liquid: 2300  Date of Last Solid: 07/10/25  Time of Last Solid: 1900  Last Intake Type: Clear fluids         Physical Exam    Airway  Mallampati: II     Cardiovascular   Rhythm: regular  Rate: normal     Dental    Pulmonary    Abdominal                                                            Pre-Anesthesia Evaluation      Nathaly Powell is a 35 y.o. female who presents for the above mentioned procedure due to PALB2 gene mutation positive [Z15.89];Long-term current use of tamoxifen [Z79.810];Surgical counseling visit [Z71.89]       Medical History[1]  Surgical History[2]  Social History[3]  RX Allergies[4]  Current Medications[5]  Prior to Admission medications    Medication Sig Start Date End Date Taking? Authorizing Provider   acetaminophen (Tylenol 8 HOUR) 650 mg ER tablet Take 1 tablet (650 mg) by mouth every 8 hours if needed for mild pain (1 - 3). Do not crush, chew, or split.   Yes Historical Provider, MD   acyclovir (Zovirax) 400 mg tablet Take 1 tablet (400 mg) by mouth 2 times a day.  Patient taking differently: Take 1 tablet (400 mg) by mouth if needed. 1/6/25  Yes Orion Menjivar,    anastrozole (Arimidex) 1 mg tablet Take 1 tablet (1 mg total) by mouth once daily.  Swallow whole  "with a drink of water. 7/2/25 9/30/25 Yes Emerita Griffin, APRN-CNP   cholecalciferol (Vitamin D-3) 25 MCG (1000 UT) tablet Take 1 tablet (25 mcg) by mouth once daily.   Yes Historical Provider, MD   ibuprofen 400 mg tablet Take 1 tablet (400 mg) by mouth every 6 hours if needed for moderate pain (4 - 6).   Yes Historical Provider, MD   lysine 500 mg tablet Take 1 tablet (500 mg) by mouth once daily. 6/9/20  Yes Historical Provider, MD   magnesium 250 mg tablet Take by mouth early in the morning..   Yes Historical Provider, MD   multivitamin tablet Take 1 tablet by mouth once daily.   Yes Historical Provider, MD   pyridoxine (Vitamin B-6) 25 mg tablet Take 1 tablet (25 mg) by mouth once daily.   Yes Historical Provider, MD   estradiol (Estrace) 0.01 % (0.1 mg/gram) vaginal cream Use nightly for one month, then decrease to 1-2 times/week. May use applicator or place dime-sized amount onto finger & spread over vaginal opening, applying small amount inside opening. Wash hands after use. 3/24/25   Katherine Lorenzo MD     No medication comments found.   Visit Vitals  /69   Pulse 68   Temp 36 °C (96.8 °F) (Temporal)   Resp 18   Ht 1.727 m (5' 8\")   Wt 88.7 kg (195 lb 8.8 oz)   SpO2 98%   BMI 29.73 kg/m²   OB Status Menopausal   Smoking Status Never   BSA 2.06 m²     Lab Results   Component Value Date/Time    WBC 5.7 07/08/2025 1521    HGB 12.1 07/08/2025 1521    HCT 36.4 07/08/2025 1521     07/08/2025 1521    INR 1.0 09/28/2020 1559    ABO O 07/08/2025 1521    LABRH POS 07/08/2025 1521    ABSCRN NEG 07/08/2025 1521     Lab Results   Component Value Date    TSH 2.10 10/01/2022    HGBA1C 5.4 10/01/2022    GLUCOSE 86 07/08/2025     07/08/2025    K 4.6 07/08/2025     07/08/2025    CREATININE 0.86 07/08/2025    BUN 12 07/08/2025    EGFR 89 07/08/2025    CO2 29 07/08/2025    AST 21 03/04/2025    ALT 28 03/04/2025     No results found for: \"STAPHMRSASCR\"  No results found for this or any previous " "visit (from the past 4464 hours).  No results found for: \"EF\"  Results for orders placed in visit on 05/03/19    Onco-Cardiology Echo    Narrative  [facility] [address] [place] [address]  Tel [phone] and Fax [phone]    TRANSTHORACIC ECHOCARDIOGRAM REPORT      Patient Name:     MARTÍNEZ AMEZCUA Reading Physician:   [id number] Ke Daniel MD  Study Date:       5/3/2019       Referring Physician: Kamala Gomez MD  MRN/PID:          [id number]       PCP:  Accession/Order#: [id number]      Department Location: [facility] Echo Lab  YOB: 1990       Fellow:  Gender:           F              Nurse:  Admit Date:                      Sonographer:         VIVIAN Morrissey RDCS  Admission Status: Outpatient     Additional Staff:  Height:           175.26 cm      CC Report to:  Weight:           77.11 kg       Study Type:          Onco-Cardiology Echo  BSA:              1.93 m2  Blood Pressure: 107 /62 mmHg    Diagnosis/ICD: Z01.818-Encounter for other preprocedural examination  Indication:    Examination prior to chemotherapy  Procedure/CPT: Echo Complete w Full Doppler-47931; Myocardial Strain  Imaging-0399T; 3D Rendering w/o independent workstation-[id number]    Patient History:  Pertinent History: Recently diagnosed Breast Cancer.    Study Detail: The following Echo studies were performed: 2D, M-Mode, Doppler and  color flow.      PHYSICIAN INTERPRETATION:  Left Ventricle: The left ventricular systolic function is normal, with an estimated ejection fraction of 60-65%. The left ventricular cavity size is normal. There is no evidence of left ventricular hypertrophy. Spectral Doppler shows a normal pattern of left ventricular diastolic filling.  Left Atrium: The left atrium is normal in size.  Right Ventricle: The right ventricle is normal in size. There is normal right ventricular global systolic function.  Right Atrium: The right atrium is normal in size.  Aortic " Valve: The aortic valve is trileaflet. There is no evidence of aortic valve regurgitation. The peak instantaneous gradient of the aortic valve is 8.9 mmHg.  Mitral Valve: The mitral valve is normal in structure. There is no evidence of mitral valve regurgitation.  Tricuspid Valve: The tricuspid valve is structurally normal. There is trace tricuspid regurgitation.  Pulmonic Valve: The pulmonic valve is structurally normal. There is physiologic pulmonic valve regurgitation.  Pericardium: There is no pericardial effusion noted.  Aorta: The aortic root is normal.  Pulmonary Artery: The tricuspid regurgitant velocity is 1.88 m/s, and with an estimated right atrial pressure of 3 mmHg, the estimated pulmonary artery pressure is normal with the RVSP at 17.2 mmHg.  Systemic Veins: The inferior vena cava appears to be of normal size. There is IVC inspiratory collapse greater than 50%.    ONCO-CARDIOLOGY:  Vital Signs: Kendell patients heart rate during the study was 65 beats per minute.  The patients blood pressure was 107/62 during the study.  Machine: This study was performed on the [employer].      Onco-Cardiology Measurements:  Current Measurements  2D EF (Biplane)                     63%  3D EF                               65%  Global Longitudinal Strain (GLS) -18.7%    GLS Tracking Quality: Good      CONCLUSIONS:  1. The left ventricular systolic function is normal with a 60-65% estimated ejection fraction.  2. There is no evidence of left ventricular hypertrophy.    QUANTITATIVE DATA SUMMARY:  2D MEASUREMENTS:  Normal Ranges:  Ao Root d:     2.70 cm   (2.0-3.7cm)  LAs:           3.30 cm   (2.7-4.0cm)  IVSd:          0.69 cm   (0.6-1.1cm)  LVPWd:         0.74 cm   (0.6-1.1cm)  LVIDd:         4.60 cm   (3.9-5.9cm)  LVIDs:         3.27 cm  LV Mass Index: 53.2 g/m2  LV % FS        28.9 %    LA VOLUME:  Normal Ranges:  LA Vol A4C:        40.6 ml    (22+/-6mL/m2)  LA Vol A2C:        32.8 ml  LA Vol BP:         36.8 ml  LA Vol Index  A4C:  21.1 ml/m2  LA Vol Index A2C:  17.0 ml/m2  LA Vol Index BP:   19.1 ml/m2  LA Area A4C:       15.3 cm2  LA Area A2C:       13.6 cm2  LA Major Axis A4C: 4.9 cm  LA Major Axis A2C: 4.8 cm  LA Vol A4C:        38.3 ml  LA Vol A2C:        31.3 ml    RA VOLUME BY A/L METHOD:  Normal Ranges:  RA Area A4C: 12.7 cm2    M-MODE MEASUREMENTS:  Normal Ranges:  Ao Root: 2.70 cm (2.0-3.7cm)  LAs:     3.27 cm (2.7-4.0cm)    LV DIASTOLIC FUNCTION:  Normal Ranges:  MV Peak E:      0.83 m/s   (0.7-1.2 m/s)  MV Peak A:      0.48 m/s   (0.42-0.7 m/s)  E/A Ratio:      1.74       (1.0-2.2)  MV e'           0.15 m/s   (>8.0)  MV lateral e'   0.19 m/s  MV medial e'    0.11 m/s  MV A Dur:       99.19 msec  E/e' Ratio:     5.53       (<8.0)  MV DT:          157 msec   (150-240 msec)  PulmV Sys Maynor:  39.71 cm/s  PulmV Richey Maynor: 56.67 cm/s  PulmV S/D Maynor:  0.70    MITRAL VALVE:  Normal Ranges:  MV DT: 157 msec (150-240msec)    AORTIC VALVE:  Normal Ranges:  AoV Vmax:      1.49 m/s (<1.7m/s)  AoV Peak P.9 mmHg (<20mmHg)  LVOT Max Maynor:  1.16 m/s (<1.1m/s)  LVOT VTI:      23.04 cm  LVOT Diameter: 1.97 cm  (1.8-2.4cm)  AoV Area,Vmax: 2.37 cm2 (2.5-4.5cm2)    RIGHT VENTRICLE:  RV 1   2.8 cm  RV 2   2.6 cm  RV 3   6.0 cm  TAPSE: 19.0 mm  RV s'  0.11 m/s    TRICUSPID VALVE/RVSP:  Normal Ranges:  Peak TR Velocity: 1.88 m/s  RV Syst Pressure: 17.2 mmHg (< 30mmHg)    PULMONIC VALVE:  Normal Ranges:  PV Max Maynor: 0.9 m/s  (0.6-0.9m/s)  PV Max PG:  3.3 mmHg    Pulmonary Veins:  PulmV Richey Maynor: 56.67 cm/s  PulmV S/D Maynor:  0.70  PulmV Sys Maynor:  39.71 cm/s      [id number] eK Daniel MD  Electronically signed on 2019 at 9:21:05 AM         Final      Lab Results   Component Value Date    PREGTESTUR Negative 2025    HCGQUANT <2 2019            .                Anesthesia Plan    History of general anesthesia?: yes  History of complications of general anesthesia?: no    ASA 2     general     intravenous induction   Anesthetic  plan and risks discussed with patient.    Plan discussed with CRNA.             [1]  Past Medical History:  Diagnosis Date   • Axillary lymphadenopathy    • Breast cancer     right breast with lymph node metastasis, bilateral mastectomy with chemo&radiation   • Monoallelic mutation of PALB2 gene     TLH-BS as she is on tamoxifen for breast cancer. Additionally, she is considering oophorectomy given small increase in ovarian cancer risk the in the setting of PALB 2 mutation.   • Neuropathy     on/off as a result of radiation, was on Gabapentin, no longer needs since episodes are rare   • Presence of (intrauterine) contraceptive device     Intrauterine device   [2]  Past Surgical History:  Procedure Laterality Date   • BREAST RECONSTRUCTION     •  SECTION, LOW TRANSVERSE  2016   • CT ABDOMEN PELVIS ANGIOGRAM W AND/OR WO IV CONTRAST  2020    CT ABDOMEN PELVIS ANGIOGRAM W AND/OR WO IV CONTRAST 2020 [facility]   • MASTECTOMY  2020    Mastectomy bilateral   • OTHER SURGICAL HISTORY  2019    Tonsillectomy with adenoidectomy   • TONSILLECTOMY  1992    Tubes in ears/tonsils out   [3]  Social History  Tobacco Use   • Smoking status: Never   • Smokeless tobacco: Never   Vaping Use   • Vaping status: Never Used   Substance Use Topics   • Alcohol use: Yes     Comment: occasional-rare, few times a month   • Drug use: Never   [4]  No Known Allergies  [5]  No current facility-administered medications for this encounter.

## 2025-07-11 NOTE — ANESTHESIA PROCEDURE NOTES
Airway  Date/Time: 7/11/2025 7:47 AM  Reason: elective    Airway not difficult    Staffing  Performed: CLAUDIA and TAVIA   Authorized by: Thomas Laird MD    Performed by: CLAUDIA Zapien  Patient location during procedure: OR    Patient Condition  Indications for airway management: anesthesia and airway protection  Patient position: sniffing  Sedation level: deep     Final Airway Details   Preoxygenated: yes  Final airway type: endotracheal airway  Successful airway: ETT  Cuffed: yes   Successful intubation technique: video laryngoscopy (martinez)  Blade: Kristen  Blade size: #3  ETT size (mm): 7.0  Cormack-Lehane Classification: grade I - full view of glottis  Placement verified by: chest auscultation and capnometry   Measured from: lips  ETT to lips (cm): 21  Number of attempts at approach: 1

## 2025-07-11 NOTE — OP NOTE
Laparoscopic Total Hysterectomy; Bilateral Salpingo Oophorectomy; Lysis of Adhesions; Cystoscopy (B) Operative Note     Date: 2025  OR Location: [facility] A OR    Name: Nathaly Powell, : 1990, Age: 35 y.o., MRN: [id number], Sex: female    Diagnosis  Pre-op Diagnosis      * PALB2 gene mutation positive [Z15.89]     * Long-term current use of tamoxifen [Z79.810] Post-op Diagnosis     * PALB2 gene mutation positive [Z15.89]     * Long-term current use of tamoxifen [Z79.810]     Procedures  Laparoscopic Total Hysterectomy; Bilateral Salpingo Oophorectomy; Lysis of Adhesions; Cystoscopy  91405 - [place] LAPAROSCOPY TOTAL HYSTERECTOMY UTERUS >250 GM  Bilateral ureterolysis     Surgeons      * Katherine Lorenzo - Primary    Resident/Fellow/Other Assistant:  Surgeons and Role:  * No surgeons found with a matching role *    Staff:   Circulator: Paradise  Scrub Person: Millie Mauricio Circulator: Lilliam Mauricio Scrub: Krystal    Anesthesia Staff: Anesthesiologist: Thomas Laird MD  C-AA: CLAUDIA Zapien    Procedure Summary  Anesthesia: Anesthesia type not filed in the log.  ASA: II  Estimated Blood Loss: 25 mL  Intra-op Medications:   Administrations occurring from 0715 to 1045 on 25:   Medication Name Total Dose   BUPivacaine HCl (Marcaine) 0.5 % (5 mg/mL) injection 10 mL   sodium chloride 0.9 % irrigation solution 4,000 mL   Unable to Find 5 mL   ceFAZolin (Ancef) vial 1 g 2 g   dexAMETHasone (Decadron) 4 mg/mL IV Syringe 2 mL 4 mg   fentaNYL (Sublimaze) injection 50 mcg/mL 100 mcg   HYDROmorphone (Dilaudid) injection 1 mg/mL 0.2 mg   LR infusion Cannot be calculated   lidocaine PF (Xylocaine-MPF) local injection 2 % 100 mg   metroNIDAZOLE (Flagyl)  mg in 100 mL NaCl (iso) - premix 500 mg   midazolam PF (Versed) injection 1 mg/mL 2 mg   ondansetron (Zofran) 2 mg/mL injection 4 mg   propofol (Diprivan) injection 10 mg/mL 200 mg   rocuronium (ZeMuron) 50 mg/5 mL injection 90 mg   sugammadex (Bridion) 200 mg/2 mL  injection 200 mg              Anesthesia Record               Intraprocedure I/O Totals          Output    Urine 200 mL    Total Blood Loss - Surgical Delivery (mL) 25 mL    Total Output 225 mL       Other (could not be determined as input or output)    Surgical Delivery Estimated Blood Loss (mL) 25          Specimen:   ID Type Source Tests Collected by Time   1 : Uterus,Cervix, Bilateral Tubes and Ovaries Tissue UTERUS, CERVIX, FALLOPIAN TUBES AND OVARIES BILATERAL SURGICAL PATHOLOGY EXAM Katherine Lorenzo MD 7/11/2025 0829                 Drains and/or Catheters:   Urethral Catheter Non-latex 16 Fr. (Active)       Indications: Nathaly Powell is an 35 y.o. female who is having surgery for PALB2 gene mutation positive [Z15.89]  Long-term current use of tamoxifen [Z79.810].     Findings  Extensive adhesive disease between uterus and anterior abdominal wall requiring FE > 30 mins. Otherwise grossly normal upper abdominal and pelvic anatomy.     Description of Procedure  Patient was taken to the operating room where she was prepared and draped in the usual sterile fashion.  A [employer] uterine manipulator was placed.  A Wheat catheter was placed. Attention was then turned abdominally.  The base of the umbilicus was elevated using Kocher clamps.  The skin was incised with a scalpel.  The fascia was grasped with Kochers and entered sharply using a scalpel.  Peritoneum was bluntly opened using a Susan clamp.  Intraperitoneal placement was confirmed via visualization of underlying bowel.     Karishma trocar was then placed at the umbilical entry site. Diagnostic laparoscopy was performed, and revealed findings as noted above.  No areas of trauma or injury were noted immediately inferior to the umbilicus. Complete abdominal survey was performed. The patient was then placed in steep Trendelenburg positioning.     Ancillary trocars were then placed under direct visualization. Three 5mm trocars were placed, one in the LLQ, one in  the RLQ, and one trocar was placed supra-pubically in the site of her prior abdominal incision but above the scar tissue.     We then turned our attention to the adhesions between the anterior abdominal wall and uterus. Adhesions were lysed in a lateral to medial fashion until the bladder was clearly identified and the uterus mobilized off of the anterior abdominal wall.     Attention was turned to the pelvis. The right fallopian tube was elevated away from the underlying structures.  The mesosalpinx was clamped, sealed, and transected using the LigaSure device.  The fallopian tube was  to the level of the uterine cornua.  The round ligament was clamped/sealed/transected w/ Ligasure cautery and the retroperitoneal space opened. The ureter was identified in the retroperitoneum and ureterolysis was performed. A window was then made in the posterior leaf of the broad ligament and the IP ligament was skeletonized. The IP ligament was clamped/sealed/transected >2cm from the ovarian edge with Ligasure cautery. The broad ligament was then opened anteriorly and posteriorly to skeletonize the uterine vasculature.  The bladder flap was started using the LigaSure device.  The uterine vasculature was then clamped sealed and transected using the LigaSure device.  It was lateralized using the LigaSure device.      Attention was then turned to the left side of the pelvis, which was dissected in a similar fashion.     Monopolar electrosurgery was then utilized to complete the bladder flap.  The bladder was dissected away from the lower uterine segment and cervix using blunt dissection and monopolar electrosurgery.  At this time care was taken to ensure that the bladder was well out of the operative field as well as the bilateral uterine pedicles. Colpotomy was then performed using monopolar electrosurgery.  The uterus was fully  from the vagina. The uterus, cervix, and bilateral Fallopian tubes/ovaries were then  removed through the vagina.     The pelvis was inspected and noted be adequately hemostatic.  The vaginal cuff was then reapproximated laparoscopically using 0 V lock suture. Hemostasis was achieved using monopolar electrosurgery.       The umbilical fascia was then closed with 0 Vicryl suture.  The skin was closed with 4-0 Monocryl.        The Wheat catheter was removed. Cystoscopy was performed and revealed intact bladder with bilateral ureteral jets and no evidence of trauma or foreign material.      The patient was awakened from general anesthesia and taken the recovery room in stable condition.      I was present and scrubbed for the entirety of the surgical procedure. Bilateral ureterolysis was required to safely complete this case. Lysis of adhesions > 30 minutes was required to safely complete this case. This was procedure was substantially more complicated and required increase time and surgical expertise due to extensive adhesive disease due to prior abdominal surgery.    Evidence of Infection:   Complications:  None; patient tolerated the procedure well.    Disposition: PACU - hemodynamically stable.  Condition: stable       Katherine Lorenzo  Phone Number: [phone]

## 2025-07-11 NOTE — ANESTHESIA POSTPROCEDURE EVALUATION
Patient: Nathaly Powell    Procedure Summary       Date: 07/11/25 Room / Location: [facility] A OR 11 / Virtual [facility] A OR    Anesthesia Start: 0739 Anesthesia Stop: 1051    Procedure: Laparoscopic Total Hysterectomy; Bilateral Salpingo Oophorectomy; Lysis of Adhesions; Cystoscopy (Bilateral: Abdomen) Diagnosis:       PALB2 gene mutation positive      Long-term current use of tamoxifen      (PALB2 gene mutation positive [Z15.89])      (Long-term current use of tamoxifen [Z79.810])    Surgeons: Katherine Lorenzo MD Responsible Provider: Thomas Laird MD    Anesthesia Type: general ASA Status: 2            Anesthesia Type: general    Vitals Value Taken Time   /69 07/11/25 10:50   Temp 36 07/11/25 10:58   Pulse 62 07/11/25 10:58   Resp 21 07/11/25 10:58   SpO2 98 % 07/11/25 10:58   Vitals shown include unfiled device data.    Anesthesia Post Evaluation    Patient location during evaluation: bedside  Patient participation: complete - patient participated  Level of consciousness: awake  Pain management: adequate  Airway patency: patent  Cardiovascular status: acceptable  Respiratory status: acceptable  Hydration status: acceptable  Postoperative Nausea and Vomiting: none        There were no known notable events for this encounter.

## 2025-07-23 LAB
LABORATORY COMMENT REPORT: NORMAL
PATH REPORT.FINAL DX SPEC: NORMAL
PATH REPORT.GROSS SPEC: NORMAL
PATH REPORT.RELEVANT HX SPEC: NORMAL
PATH REPORT.TOTAL CANCER: NORMAL

## 2025-07-24 ENCOUNTER — OFFICE VISIT (OUTPATIENT)
Dept: OBSTETRICS AND GYNECOLOGY | Facility: CLINIC | Age: 35
End: 2025-07-24
Payer: COMMERCIAL

## 2025-07-24 VITALS
WEIGHT: 196 LBS | HEART RATE: 76 BPM | DIASTOLIC BLOOD PRESSURE: 68 MMHG | BODY MASS INDEX: 29.03 KG/M2 | HEIGHT: 69 IN | SYSTOLIC BLOOD PRESSURE: 103 MMHG

## 2025-07-24 DIAGNOSIS — Z09 POSTOP CHECK: Primary | ICD-10-CM

## 2025-07-24 PROCEDURE — 3008F BODY MASS INDEX DOCD: CPT

## 2025-07-24 PROCEDURE — 99211 OFF/OP EST MAY X REQ PHY/QHP: CPT

## 2025-07-24 PROCEDURE — 1036F TOBACCO NON-USER: CPT

## 2025-07-24 ASSESSMENT — ENCOUNTER SYMPTOMS
PSYCHIATRIC NEGATIVE: 0
ENDOCRINE NEGATIVE: 0
GASTROINTESTINAL NEGATIVE: 0
RESPIRATORY NEGATIVE: 0
CONSTITUTIONAL NEGATIVE: 0
EYES NEGATIVE: 0
ALLERGIC/IMMUNOLOGIC NEGATIVE: 0
MUSCULOSKELETAL NEGATIVE: 0
CARDIOVASCULAR NEGATIVE: 0
HEMATOLOGIC/LYMPHATIC NEGATIVE: 0
NEUROLOGICAL NEGATIVE: 0

## 2025-07-24 ASSESSMENT — PAIN SCALES - GENERAL: PAINLEVEL_OUTOF10: 0-NO PAIN

## 2025-07-24 NOTE — PROGRESS NOTES
Division of Minimally Invasive Gynecologic Surgery  [facility]    Date: 07/24/2025 - Gynecology Visit    Nathaly Powell is a 35 y.o. status post TLH-BSO, FE, cystoscopy, bilateral ureterolysis for management of PALB2 gene mutation/long term tamoxifen use by Dr. Lorenzo on 07/11/2025.    Overall recovering well, meeting all milestones. Abdominal pain is well controlled. Denies new issues with bowel and bladder. Has been increased activity without concerns. No vaginal bleeding.     PMHx, PSHx, SHx, Allergies, and Medications updated in Epic.    ROS: reviewed and negative    PE:  Constitutional:  No acute distress  HEENT: EOM grossly intact, MMM, neck supple and with full ROM  Pulm:  Effort normal. No accessory muscle usage.  No respiratory distress.  Abd: soft, non-distended, non-tender, no palpable masses, incisions c/d/i  :  - EGBUS: grossly WNL per pt   Neurological:  AAO x 3, appropriate to interview  Skin: Warm, no pallor.  Psychiatric:  normal mood and affect.    Assessment/Plan:     Nathaly Powell is a 35 y.o. status post TLH-BSO, FE, cystoscopy, bilateral ureterolysis for management of PALB2 gene mutation, long term tamoxifen use by Dr. Lorenzo.  - Recovering well, no concerns  - Path reviewed, benign  - Continue post op restrictions until 6 weeks after surgery, reviewed these today; reviewed no submergence in water for at 4-6 weeks after surgery   - Follow up is scheduled in September, plan for vaginal cuff check at this time      ALTA Holbrook-CNP  Division of Minimally Invasive Gynecologic Surgery  [facility]

## 2025-07-30 ENCOUNTER — TELEPHONE (OUTPATIENT)
Dept: OBSTETRICS AND GYNECOLOGY | Facility: CLINIC | Age: 35
End: 2025-07-30
Payer: COMMERCIAL

## 2025-07-30 NOTE — TELEPHONE ENCOUNTER
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        Patient can be reached at- [phone]

## 2025-08-26 ENCOUNTER — APPOINTMENT (OUTPATIENT)
Dept: OBSTETRICS AND GYNECOLOGY | Facility: CLINIC | Age: 35
End: 2025-08-26
Payer: COMMERCIAL

## 2025-09-17 ENCOUNTER — APPOINTMENT (OUTPATIENT)
Dept: OBSTETRICS AND GYNECOLOGY | Facility: CLINIC | Age: 35
End: 2025-09-17
Payer: COMMERCIAL

## (undated) DEVICE — SYRINGE, 60 CC, IRRIGATION, BULB, CONTRO-BULB, PAPER POUCH

## (undated) DEVICE — TRAY, FOLEY, LUBRI-SIL, 16FR, COMPLETE CARE W/STATLOCK

## (undated) DEVICE — GLOVE, SURGICAL, PROTEXIS PI ORTHO, 6.5, PF, LF

## (undated) DEVICE — SYRINGE, 50 CC, LUER LOCK

## (undated) DEVICE — Device

## (undated) DEVICE — TROCAR SYSTEM, BALLOON, KII GELPORT, 12 X 100MM

## (undated) DEVICE — SPONGE, LAP, XRAY DECT, 18IN X 18IN, W/MASTER DMT, STERILE

## (undated) DEVICE — DRESSING, ADHESIVE, ISLAND, TELFA, 2 X 3.75 IN, LF

## (undated) DEVICE — LIGASURE, V SEALER/DIVIDER  5MM BLUNT TIP

## (undated) DEVICE — POSITIONING KIT, PAGAZZI, PINK PAD XL, W/ ARM AND HEAD REST

## (undated) DEVICE — RETRACTOR, CERVICAL CUP, VCARE, STANDARD

## (undated) DEVICE — CORD, MONOPOLAR, HIGH FREQUENCY, W/8MM PLUG F/VALLEYLAB, 8FT/244CM, STRL

## (undated) DEVICE — OCCLUDER, COLPO-PNEUMO

## (undated) DEVICE — CANNULA, KII ADVANCED FIXATION, 5X100MM W/SEAL

## (undated) DEVICE — APPLICATOR, ENDOSCOPIC FLOSEAL

## (undated) DEVICE — ELECTRODE, LAPAROSCOPIC, SPATULA, 13.5 IN

## (undated) DEVICE — TUBE SET, PNEUMOCLEAR, SMOKE EVACU, HIGH-FLOW

## (undated) DEVICE — PAD, GROUNDING, ELECTROSURGICAL, W/9 FT CABLE, POLYHESIVE II, ADULT, LF

## (undated) DEVICE — SUTURE, MONOCRYL, 4-0, 18 IN, PS2, UNDYED

## (undated) DEVICE — PAD, SANITARY, OBSTETRICAL, W/ADHSV STRIP,11 IN,LF

## (undated) DEVICE — IRRIGATION SET, CYSTOSCOPY, REGULATING CLAMP, STRAIGHT, 81 IN

## (undated) DEVICE — PUMP, STRYKERFLOW 2 & HANDPIECE W/10FT. IRRIGATION TUBING

## (undated) DEVICE — FLOSEAL, MATRIX, HEMOSTATIC, FULL STERILE PREP, 5ML

## (undated) DEVICE — CLEAN KIT, ANTIFOG SCOPE, SEE SHARP 150MM

## (undated) DEVICE — GLOVE, SURGICAL, PROTEXIS PI MICRO, 6.0, PF, LF

## (undated) DEVICE — SHEAR, W/UNIPOLAR CAUTERY, ENDOSHEAR, 5 MM

## (undated) DEVICE — TROCAR, OPTICAL BLADELESS 5MM X 100 W/ADVANCED FIXATION